# Patient Record
Sex: FEMALE | Race: WHITE | NOT HISPANIC OR LATINO | Employment: OTHER | URBAN - METROPOLITAN AREA
[De-identification: names, ages, dates, MRNs, and addresses within clinical notes are randomized per-mention and may not be internally consistent; named-entity substitution may affect disease eponyms.]

---

## 2018-05-19 ENCOUNTER — OFFICE VISIT (OUTPATIENT)
Dept: URGENT CARE | Facility: CLINIC | Age: 68
End: 2018-05-19
Payer: MEDICARE

## 2018-05-19 VITALS
HEART RATE: 95 BPM | RESPIRATION RATE: 18 BRPM | WEIGHT: 203 LBS | OXYGEN SATURATION: 99 % | TEMPERATURE: 99 F | HEIGHT: 68 IN | SYSTOLIC BLOOD PRESSURE: 195 MMHG | DIASTOLIC BLOOD PRESSURE: 95 MMHG | BODY MASS INDEX: 30.77 KG/M2

## 2018-05-19 DIAGNOSIS — H92.01 OTALGIA, RIGHT EAR: ICD-10-CM

## 2018-05-19 DIAGNOSIS — J32.9 RHINOSINUSITIS: ICD-10-CM

## 2018-05-19 DIAGNOSIS — R22.0 RIGHT FACIAL SWELLING: ICD-10-CM

## 2018-05-19 DIAGNOSIS — H65.93 OTITIS MEDIA WITH EFFUSION, BILATERAL: Primary | ICD-10-CM

## 2018-05-19 DIAGNOSIS — J30.2 SEASONAL ALLERGIC RHINITIS, UNSPECIFIED TRIGGER: ICD-10-CM

## 2018-05-19 PROCEDURE — 99203 OFFICE O/P NEW LOW 30 MIN: CPT | Mod: S$GLB,,, | Performed by: NURSE PRACTITIONER

## 2018-05-19 RX ORDER — AZELASTINE 1 MG/ML
1 SPRAY, METERED NASAL 2 TIMES DAILY
Qty: 30 ML | Refills: 0 | Status: SHIPPED | OUTPATIENT
Start: 2018-05-19 | End: 2023-02-24

## 2018-05-19 RX ORDER — AZITHROMYCIN 250 MG/1
TABLET, FILM COATED ORAL
Qty: 6 TABLET | Refills: 0 | Status: SHIPPED | OUTPATIENT
Start: 2018-05-19 | End: 2018-05-24

## 2018-05-19 RX ORDER — PREDNISONE 20 MG/1
40 TABLET ORAL DAILY
Qty: 10 TABLET | Refills: 0 | Status: SHIPPED | OUTPATIENT
Start: 2018-05-19 | End: 2018-05-24

## 2018-05-19 NOTE — PROGRESS NOTES
"Subjective:       Patient ID: Guillermina Martinez is a 67 y.o. female.    Vitals:    05/19/18 1424   BP: (!) 195/95   Pulse: 95   Resp: 18   Temp: 98.6 °F (37 °C)   SpO2: 99%   Weight: 92.1 kg (203 lb)   Height: 5' 7.5" (1.715 m)       Chief Complaint: Ear Fullness (BOTH PT SEEN ENT ON WEDNESDAY EAR FLUSHED OUT )    PATIENT STARTED WITH ALLERGIES AND CONGESTION A FEW WEEKS AGO. PATIENT WENT TO ENT Wednesday (4 DAYS AGO) TO HAVE HER EARS "FLUSHED". SHE ALSO HAD A STEROID SHOT . SHE NOTED IMPROVEMENT IN HER EAR PAIN, FULLNESS, AND MUFFLED SOUNDS. SHE THEN WENT TO THE DENTIST FOR A CLEANING AND FEELS OPENING HER MOUTH REAGGREGATED HER EAR AND SHE IS UNABLE TO POP IT, PAINFUL, TROUBLE HEARING, AND FEELS FACE SLIGHTLY TENDER ON RIGHT CHEEKBONE NEAR EAR. DENIES JAWPAIN, CHEEK PAIN, GUM OR TOOTH PAIN. DENTIST VISIT WAS JUST CLEANING AND NO HISTORY OF ABSCESS. DENIES FEVER. LEFT EAR WITHOUT COMPLAINTS. SHE REPORTS LONG HISTORY WITH ALLERGIES.    BP ELEVATED /95. PATIENT DOES NOT HAVE A PCP. SHE STATES SHE WAS BUSY CARING FOR HER ILL MOTHER, SHE NEGLECTED HERSELF. HER MOTHER PASSED OVER A YEAR AGO. ADVISED FOR PCP REFERRAL . SHE REFUSED STATING SHE WILL GET ONE WITH [facility] AND ASSURED ME SHE WOULD CALL NEXT WEEK. DISCUSSED IMPORTANCE OF BP CONTROL.        Sinus Problem   This is a new problem. The current episode started in the past 7 days. The problem is unchanged. There has been no fever. Her pain is at a severity of 0/10. Associated symptoms include ear pain (FULLNESS) and sinus pressure. Pertinent negatives include no chills, congestion, coughing, headaches, hoarse voice, shortness of breath or sore throat. Sneezing: FACE  Treatments tried: OTC ALLERGY MEDS. The treatment provided mild relief.     Review of Systems   Constitution: Positive for weakness (CANT SLEEP ). Negative for chills, fever and malaise/fatigue.   HENT: Positive for ear pain (FULLNESS) and sinus pressure. Negative for congestion, hoarse voice and sore " throat. Sneezing: FACE     Eyes: Negative for discharge and redness.   Cardiovascular: Negative for chest pain, dyspnea on exertion and leg swelling.   Respiratory: Negative for cough, shortness of breath, sputum production and wheezing.    Musculoskeletal: Negative for myalgias.   Gastrointestinal: Negative for abdominal pain and nausea.   Neurological: Negative for headaches.       Objective:      Physical Exam   Constitutional: She is oriented to person, place, and time. She appears well-developed and well-nourished. She is cooperative.  Non-toxic appearance. She does not appear ill. No distress.   HENT:   Head: Normocephalic and atraumatic.   Right Ear: Hearing, external ear and ear canal normal. Tympanic membrane is injected. A middle ear effusion is present.   Left Ear: Hearing, tympanic membrane, external ear and ear canal normal.   Nose: Mucosal edema and rhinorrhea present. No nasal deformity. No epistaxis. Right sinus exhibits no maxillary sinus tenderness and no frontal sinus tenderness. Left sinus exhibits no maxillary sinus tenderness and no frontal sinus tenderness.   Mouth/Throat: Uvula is midline and mucous membranes are normal. No trismus in the jaw. Normal dentition. No uvula swelling. Posterior oropharyngeal erythema present.   COBBLESTONING TO POSTERIOR OP.  MINIMAL SWELLING ALONG RIGHT JAWLINE, POSTERIOR TO LOBULE. NO TTP.  NASAL CONGESTION NOTED.     Eyes: Conjunctivae and lids are normal. No scleral icterus.   Sclera clear bilat   Neck: Trachea normal, full passive range of motion without pain and phonation normal. Neck supple.   Cardiovascular: Normal rate, regular rhythm, normal heart sounds, intact distal pulses and normal pulses.    Pulmonary/Chest: Effort normal and breath sounds normal. No respiratory distress.   Abdominal: Soft. Normal appearance and bowel sounds are normal. She exhibits no distension. There is no tenderness.   Musculoskeletal: Normal range of motion. She exhibits no  edema or deformity.   Lymphadenopathy:     She has cervical adenopathy.   Neurological: She is alert and oriented to person, place, and time. She exhibits normal muscle tone. Coordination normal.   Skin: Skin is warm, dry and intact. She is not diaphoretic. No pallor.   Psychiatric: She has a normal mood and affect. Her speech is normal and behavior is normal. Judgment and thought content normal. Cognition and memory are normal.   Nursing note and vitals reviewed.      Assessment:       1. Otitis media with effusion, bilateral    2. Otalgia, right ear    3. Seasonal allergic rhinitis, unspecified trigger    4. Right facial swelling    5. Rhinosinusitis        Plan:       Guillermina was seen today for ear fullness.    Diagnoses and all orders for this visit:    Otitis media with effusion, bilateral  -     predniSONE (DELTASONE) 20 MG tablet; Take 2 tablets (40 mg total) by mouth once daily.  -     azithromycin (Z-JACLYN) 250 MG tablet; Take 2 tablets by mouth on day 1; Take 1 tablet by mouth on days 2-5    Otalgia, right ear  -     predniSONE (DELTASONE) 20 MG tablet; Take 2 tablets (40 mg total) by mouth once daily.    Seasonal allergic rhinitis, unspecified trigger  -     azelastine (ASTELIN) 137 mcg (0.1 %) nasal spray; 1 spray (137 mcg total) by Nasal route 2 (two) times daily.    Right facial swelling    Rhinosinusitis  -     azithromycin (Z-JACLYN) 250 MG tablet; Take 2 tablets by mouth on day 1; Take 1 tablet by mouth on days 2-5      Patient Instructions   TAKE ALLEGRA AND FLONASE AS YOUR ENT DISCUSSED.  TAKE Coricidin® HBP FOR THE NEXT 7-14 DAYS FOR CONGESTION.  TAKE RX PREDNISONE FOR THE NEXT 5 DAYS.  TAKE ANTIBIOTIC AZITHROMYCIN FOR COMPLETION.  TAKE RX ASTELIN SPRAY-ONE SPRAY IN NARE IN MORNING, ONE IN AFTERNOON, AFTER FLONASE.    FOLLOW UP WITH PRIMARY CARE FOR MANAGEMENT OF YOUR BLOOD PRESSURE.    SEE HANDOUT ON EAR PAIN/INFECTION  FOLLOW UP WITH YOUR ENT IF SYMPTOMS DO NOT IMPROVE.      Nasal Allergies: Related  Problems  Allergies can cause nasal passages to swell. This narrows the air passages. Allergies also cause increased mucus production in the nose. These changes result in nasal allergy symptoms. Common symptoms include itching, sneezing, stuffy nose, and runny nose. Nasal allergies can also cause problems in other parts of the respiratory system. Some of the more common problems are discussed below. If you think you have any of these problems, talk to your healthcare provider about treatment choices.    Sinus infections  Fluid may be trapped in the sinuses. Bacteria may grow in trapped fluid. This causes sinus infection (sinusitis).  Conjunctivitis  Allergens irritate your eyes, including the lining of the conjunctiva. This causes eyes to become red, itchy, puffy, and watery.  Ear problems  The eustachian tube connects the middle ear to nasal passages.  Allergies can block this tube, and make the ears feel plugged. Fluid may also build up, leading to an ear infection (otitis media).  Nasal polyps  Allergies cause nasal passages to swell. Constant swelling can lead to formation of a sac called a polyp. Polyps can grow large enough to block nasal passages.  Asthma  Asthma is inflammation and swelling of the air passages in the lungs. The symptoms are wheezing, shortness of breath, coughing, and chest tightness. Allergies, including nasal allergies, are common in people with asthma.  Date Last Reviewed: 9/1/2016 © 2000-2017 [employer]. [address] [place] [address]. All rights reserved. This information is not intended as a substitute for professional medical care. Always follow your healthcare professional's instructions.        Common Middle Ear Problems    Your middle ear may have been injured or infected recently. Over time, certain growths or bone disease can also harm the middle ear. Left untreated, middle ear problems often lead to lifelong hearing loss. There are two types of hearing  loss: conductive and sensorineural. One or both kinds can occur. Injury, infection, certain growths, or bone disease can cause your symptoms. A ruptured eardrum or a long-lasting (chronic) ear infection may be painful and decrease hearing.  Symptoms  · Hearing loss in one or both ears  · Fluid, often smelly, draining from the ear  · Pain, pressure, or discomfort in the ear  · Ringing in the ear  Conductive and sensorineural hearing loss  Sound waves may be disrupted before they reach the inner ear. If this happens, conductive hearing loss may occur. The ear canal can be blocked by wax, infection, a tumor, or a foreign object. The eardrum can be injured or infected. Abnormal bone growth, infection, or tumors in the middle ear can block sound waves.  Sound waves may not be processed correctly in the inner ear. If this happens, sensorineural hearing loss may occur. Permanent hearing loss is most commonly associated with sensorineural problems.  The tests and evaluations used to diagnose what type of hearing problem you have will depend on your symptoms.   Date Last Reviewed: 10/1/2016  © 4353-2146 [employer]. [address] [place] [address]. All rights reserved. This information is not intended as a substitute for professional medical care. Always follow your healthcare professional's instructions.

## 2018-05-19 NOTE — PATIENT INSTRUCTIONS
TAKE ALLEGRA AND FLONASE AS YOUR ENT DISCUSSED.  TAKE Coricidin® HBP FOR THE NEXT 7-14 DAYS FOR CONGESTION.  TAKE RX PREDNISONE FOR THE NEXT 5 DAYS.  TAKE ANTIBIOTIC AZITHROMYCIN FOR COMPLETION.  TAKE RX ASTELIN SPRAY-ONE SPRAY IN NARE IN MORNING, ONE IN AFTERNOON, AFTER FLONASE.    FOLLOW UP WITH PRIMARY CARE FOR MANAGEMENT OF YOUR BLOOD PRESSURE.    SEE HANDOUT ON EAR PAIN/INFECTION  FOLLOW UP WITH YOUR ENT IF SYMPTOMS DO NOT IMPROVE.      Nasal Allergies: Related Problems  Allergies can cause nasal passages to swell. This narrows the air passages. Allergies also cause increased mucus production in the nose. These changes result in nasal allergy symptoms. Common symptoms include itching, sneezing, stuffy nose, and runny nose. Nasal allergies can also cause problems in other parts of the respiratory system. Some of the more common problems are discussed below. If you think you have any of these problems, talk to your healthcare provider about treatment choices.    Sinus infections  Fluid may be trapped in the sinuses. Bacteria may grow in trapped fluid. This causes sinus infection (sinusitis).  Conjunctivitis  Allergens irritate your eyes, including the lining of the conjunctiva. This causes eyes to become red, itchy, puffy, and watery.  Ear problems  The eustachian tube connects the middle ear to nasal passages.  Allergies can block this tube, and make the ears feel plugged. Fluid may also build up, leading to an ear infection (otitis media).  Nasal polyps  Allergies cause nasal passages to swell. Constant swelling can lead to formation of a sac called a polyp. Polyps can grow large enough to block nasal passages.  Asthma  Asthma is inflammation and swelling of the air passages in the lungs. The symptoms are wheezing, shortness of breath, coughing, and chest tightness. Allergies, including nasal allergies, are common in people with asthma.  Date Last Reviewed: 9/1/2016  © 4896-7859 [employer] [address]  [place] [address]. All rights reserved. This information is not intended as a substitute for professional medical care. Always follow your healthcare professional's instructions.        Common Middle Ear Problems    Your middle ear may have been injured or infected recently. Over time, certain growths or bone disease can also harm the middle ear. Left untreated, middle ear problems often lead to lifelong hearing loss. There are two types of hearing loss: conductive and sensorineural. One or both kinds can occur. Injury, infection, certain growths, or bone disease can cause your symptoms. A ruptured eardrum or a long-lasting (chronic) ear infection may be painful and decrease hearing.  Symptoms  · Hearing loss in one or both ears  · Fluid, often smelly, draining from the ear  · Pain, pressure, or discomfort in the ear  · Ringing in the ear  Conductive and sensorineural hearing loss  Sound waves may be disrupted before they reach the inner ear. If this happens, conductive hearing loss may occur. The ear canal can be blocked by wax, infection, a tumor, or a foreign object. The eardrum can be injured or infected. Abnormal bone growth, infection, or tumors in the middle ear can block sound waves.  Sound waves may not be processed correctly in the inner ear. If this happens, sensorineural hearing loss may occur. Permanent hearing loss is most commonly associated with sensorineural problems.  The tests and evaluations used to diagnose what type of hearing problem you have will depend on your symptoms.   Date Last Reviewed: 10/1/2016  © 5452-1307 [employer]. [address] [place] [address]. All rights reserved. This information is not intended as a substitute for professional medical care. Always follow your healthcare professional's instructions.

## 2019-12-31 ENCOUNTER — OFFICE VISIT (OUTPATIENT)
Dept: FAMILY MEDICINE | Facility: CLINIC | Age: 69
End: 2019-12-31
Payer: MEDICARE

## 2019-12-31 VITALS
TEMPERATURE: 98 F | HEART RATE: 72 BPM | OXYGEN SATURATION: 98 % | HEIGHT: 67 IN | RESPIRATION RATE: 17 BRPM | BODY MASS INDEX: 32.53 KG/M2 | SYSTOLIC BLOOD PRESSURE: 168 MMHG | WEIGHT: 207.25 LBS | DIASTOLIC BLOOD PRESSURE: 92 MMHG

## 2019-12-31 DIAGNOSIS — E66.8 OTHER OBESITY: ICD-10-CM

## 2019-12-31 DIAGNOSIS — Z11.59 NEED FOR HEPATITIS C SCREENING TEST: ICD-10-CM

## 2019-12-31 DIAGNOSIS — Z13.220 SCREENING FOR HYPERLIPIDEMIA: ICD-10-CM

## 2019-12-31 DIAGNOSIS — I10 HYPERTENSION, ESSENTIAL: Primary | ICD-10-CM

## 2019-12-31 PROCEDURE — 1126F AMNT PAIN NOTED NONE PRSNT: CPT | Mod: ,,, | Performed by: INTERNAL MEDICINE

## 2019-12-31 PROCEDURE — 1159F PR MEDICATION LIST DOCUMENTED IN MEDICAL RECORD: ICD-10-PCS | Mod: ,,, | Performed by: INTERNAL MEDICINE

## 2019-12-31 PROCEDURE — 1159F MED LIST DOCD IN RCRD: CPT | Mod: ,,, | Performed by: INTERNAL MEDICINE

## 2019-12-31 PROCEDURE — 99999 PR PBB SHADOW E&M-EST. PATIENT-LVL III: ICD-10-PCS | Mod: PBBFAC,,, | Performed by: INTERNAL MEDICINE

## 2019-12-31 PROCEDURE — 1126F PR PAIN SEVERITY QUANTIFIED, NO PAIN PRESENT: ICD-10-PCS | Mod: ,,, | Performed by: INTERNAL MEDICINE

## 2019-12-31 PROCEDURE — 93010 EKG 12-LEAD: ICD-10-PCS | Mod: S$PBB,,, | Performed by: INTERNAL MEDICINE

## 2019-12-31 PROCEDURE — 93005 ELECTROCARDIOGRAM TRACING: CPT | Mod: PBBFAC,PN | Performed by: INTERNAL MEDICINE

## 2019-12-31 PROCEDURE — 99203 OFFICE O/P NEW LOW 30 MIN: CPT | Mod: S$PBB,,, | Performed by: INTERNAL MEDICINE

## 2019-12-31 PROCEDURE — 99999 PR PBB SHADOW E&M-EST. PATIENT-LVL III: CPT | Mod: PBBFAC,,, | Performed by: INTERNAL MEDICINE

## 2019-12-31 PROCEDURE — 81003 URINALYSIS AUTO W/O SCOPE: CPT

## 2019-12-31 PROCEDURE — 93010 ELECTROCARDIOGRAM REPORT: CPT | Mod: S$PBB,,, | Performed by: INTERNAL MEDICINE

## 2019-12-31 PROCEDURE — 99203 PR OFFICE/OUTPT VISIT, NEW, LEVL III, 30-44 MIN: ICD-10-PCS | Mod: S$PBB,,, | Performed by: INTERNAL MEDICINE

## 2019-12-31 PROCEDURE — 99213 OFFICE O/P EST LOW 20 MIN: CPT | Mod: PBBFAC,PN | Performed by: INTERNAL MEDICINE

## 2019-12-31 RX ORDER — CHLORTHALIDONE 25 MG/1
25 TABLET ORAL DAILY
Qty: 30 TABLET | Refills: 2 | Status: SHIPPED | OUTPATIENT
Start: 2019-12-31 | End: 2020-01-22 | Stop reason: SDUPTHER

## 2019-12-31 NOTE — PROGRESS NOTES
Subjective:       Patient ID: Guillermina Martinez is a 69 y.o. female.    Chief Complaint: Establish Care; Follow-up; and Hypertension (/97 No meds taken )    Hypertension   This is a new problem. The problem is uncontrolled. Pertinent negatives include no anxiety, blurred vision, chest pain, headaches, palpitations, peripheral edema or shortness of breath. There are no associated agents to hypertension. Risk factors for coronary artery disease include post-menopausal state and obesity. Past treatments include nothing.   has been told she has elevated blood pressure by dentist for at least last year never taken any medicaition doing silver sneaker exercise classes two to three times per week w/o exertional symptoms never used tobacco no alcohol. Lives alone retired .   Father with HTN no family history of early CAD   PSurgHx: right arm fracture with hardware  Review of Systems   Constitutional: Negative for activity change, appetite change, fatigue, fever and unexpected weight change.   HENT: Negative for ear pain, rhinorrhea and sore throat.    Eyes: Negative for blurred vision, discharge and visual disturbance.   Respiratory: Negative for chest tightness, shortness of breath and wheezing.    Cardiovascular: Negative for chest pain, palpitations and leg swelling.   Gastrointestinal: Negative for abdominal pain, constipation and diarrhea.   Endocrine: Negative for cold intolerance and heat intolerance.   Genitourinary: Negative for dysuria and hematuria.   Musculoskeletal: Negative for joint swelling and neck stiffness.   Skin: Negative for rash.   Neurological: Negative for dizziness, syncope, weakness and headaches.   Psychiatric/Behavioral: Negative for suicidal ideas.       Objective:     Vitals:    12/31/19 0953 12/31/19 1015   BP: (!) 179/97 (!) 168/92   BP Location: Right arm    Patient Position: Sitting    Pulse: 97 72   Resp: 17    Temp: 98.1 °F (36.7 °C)    TempSrc: Oral    SpO2: 98%    Weight:  "94 kg (207 lb 3.7 oz)    Height: 5' 7" (1.702 m)           Physical Exam   Constitutional: She is oriented to person, place, and time. She appears well-developed and well-nourished.   HENT:   Head: Normocephalic and atraumatic.   Eyes: Conjunctivae are normal. No scleral icterus.   Neck: Normal range of motion.   Cardiovascular: Normal rate and regular rhythm. Exam reveals no gallop and no friction rub.   No murmur heard.  No LE edema   Pulmonary/Chest: Effort normal and breath sounds normal. She has no wheezes. She has no rales.   Abdominal: Soft. Bowel sounds are normal. There is no tenderness. There is no rebound and no guarding.   Musculoskeletal: Normal range of motion. She exhibits no edema or tenderness.   Neurological: She is alert and oriented to person, place, and time. No cranial nerve deficit.   Skin: Skin is warm and dry.   Psychiatric: She has a normal mood and affect.     EKG sinus rate 80 no LVH or acute ischemic changes  Assessment and Plan   1. Hypertension, essential  Screen for secondary cause. Will likely need two agents given level of elevation start with thiazide BP check in one week see me in two weeks for further adjustment  - CBC auto differential; Future  - Comprehensive metabolic panel; Future  - Urinalysis  - EKG 12-lead  - TSH; Future  - chlorthalidone (HYGROTEN) 25 MG Tab; Take 1 tablet (25 mg total) by mouth once daily.  Dispense: 30 tablet; Refill: 2    2. Screening for hyperlipidemia  Fasting lipid for further risk stratification  - Comprehensive metabolic panel; Future  - Lipid panel; Future    3. Need for hepatitis C screening test  Hep c ab  - Hepatitis C antibody; Future    4. Other obesity   Screen for endocrine causes  - Lipid panel; Future  - TSH; Future    Will discuss preventative screening tests at follow up   "

## 2020-01-02 LAB
BILIRUB UR QL STRIP: NEGATIVE
CLARITY UR: CLEAR
COLOR UR: YELLOW
GLUCOSE UR QL STRIP: NEGATIVE
HGB UR QL STRIP: NEGATIVE
KETONES UR QL STRIP: ABNORMAL
LEUKOCYTE ESTERASE UR QL STRIP: NEGATIVE
NITRITE UR QL STRIP: NEGATIVE
PH UR STRIP: 7 [PH] (ref 5–8)
PROT UR QL STRIP: NEGATIVE
SP GR UR STRIP: 1.02 (ref 1–1.03)
URN SPEC COLLECT METH UR: ABNORMAL
UROBILINOGEN UR STRIP-ACNC: NEGATIVE EU/DL

## 2020-01-08 ENCOUNTER — PATIENT OUTREACH (OUTPATIENT)
Dept: ADMINISTRATIVE | Facility: HOSPITAL | Age: 70
End: 2020-01-08

## 2020-01-08 DIAGNOSIS — Z12.31 VISIT FOR SCREENING MAMMOGRAM: Primary | ICD-10-CM

## 2020-01-10 ENCOUNTER — CLINICAL SUPPORT (OUTPATIENT)
Dept: FAMILY MEDICINE | Facility: CLINIC | Age: 70
End: 2020-01-10
Payer: MEDICARE

## 2020-01-10 VITALS — HEART RATE: 87 BPM | DIASTOLIC BLOOD PRESSURE: 80 MMHG | OXYGEN SATURATION: 97 % | SYSTOLIC BLOOD PRESSURE: 136 MMHG

## 2020-01-10 DIAGNOSIS — I10 HYPERTENSION, ESSENTIAL: Primary | ICD-10-CM

## 2020-01-10 PROCEDURE — 99999 PR PBB SHADOW E&M-EST. PATIENT-LVL II: ICD-10-PCS | Mod: PBBFAC,,,

## 2020-01-10 PROCEDURE — 99212 OFFICE O/P EST SF 10 MIN: CPT | Mod: PBBFAC,PN

## 2020-01-10 PROCEDURE — 99499 NO LOS: ICD-10-PCS | Mod: S$PBB,,, | Performed by: INTERNAL MEDICINE

## 2020-01-10 PROCEDURE — 99999 PR PBB SHADOW E&M-EST. PATIENT-LVL II: CPT | Mod: PBBFAC,,,

## 2020-01-10 PROCEDURE — 99499 UNLISTED E&M SERVICE: CPT | Mod: S$PBB,,, | Performed by: INTERNAL MEDICINE

## 2020-01-10 NOTE — PROGRESS NOTES
Apollo CHUNG May 69 y.o. female is here today for Blood Pressure check.   History of HTN yes.     Review of patient's allergies indicates:   Allergen Reactions    Penicillins      Creatinine   Date Value Ref Range Status   12/31/2019 0.7 0.5 - 1.4 mg/dL Final     Sodium   Date Value Ref Range Status   12/31/2019 135 (L) 136 - 145 mmol/L Final     Potassium   Date Value Ref Range Status   12/31/2019 3.9 3.5 - 5.1 mmol/L Final   ]  Patient verifies taking blood pressure medications on a regular basis at the same time of the day.     Current Outpatient Medications:     azelastine (ASTELIN) 137 mcg (0.1 %) nasal spray, 1 spray (137 mcg total) by Nasal route 2 (two) times daily., Disp: 30 mL, Rfl: 0    chlorthalidone (HYGROTEN) 25 MG Tab, Take 1 tablet (25 mg total) by mouth once daily., Disp: 30 tablet, Rfl: 2    loratadine (CLARITIN) 5 mg chewable tablet, Take 5 mg by mouth once daily., Disp: , Rfl:   Does patient have record of home blood pressure readings no. Readings have been averaging .   Last dose of blood pressure medication was taken at 6:30AM.  Patient is asymptomatic.   Complains of no complaints.    BP: 136/80 , Pulse: 87 .    Blood pressure reading after 15 minutes was not repeated.  Dr. Hinojosa notified.

## 2020-01-17 DIAGNOSIS — Z12.11 COLON CANCER SCREENING: ICD-10-CM

## 2020-01-22 ENCOUNTER — OFFICE VISIT (OUTPATIENT)
Dept: FAMILY MEDICINE | Facility: CLINIC | Age: 70
End: 2020-01-22
Payer: MEDICARE

## 2020-01-22 VITALS
TEMPERATURE: 97 F | OXYGEN SATURATION: 100 % | HEIGHT: 67 IN | SYSTOLIC BLOOD PRESSURE: 132 MMHG | BODY MASS INDEX: 31.18 KG/M2 | HEART RATE: 80 BPM | WEIGHT: 198.63 LBS | RESPIRATION RATE: 17 BRPM | DIASTOLIC BLOOD PRESSURE: 86 MMHG

## 2020-01-22 DIAGNOSIS — E78.5 HYPERLIPIDEMIA, UNSPECIFIED HYPERLIPIDEMIA TYPE: ICD-10-CM

## 2020-01-22 DIAGNOSIS — K21.9 GASTROESOPHAGEAL REFLUX DISEASE, ESOPHAGITIS PRESENCE NOT SPECIFIED: Primary | ICD-10-CM

## 2020-01-22 DIAGNOSIS — I10 HYPERTENSION, ESSENTIAL: ICD-10-CM

## 2020-01-22 PROCEDURE — 1159F MED LIST DOCD IN RCRD: CPT | Mod: ,,, | Performed by: INTERNAL MEDICINE

## 2020-01-22 PROCEDURE — 99214 OFFICE O/P EST MOD 30 MIN: CPT | Mod: S$PBB,,, | Performed by: INTERNAL MEDICINE

## 2020-01-22 PROCEDURE — 1126F PR PAIN SEVERITY QUANTIFIED, NO PAIN PRESENT: ICD-10-PCS | Mod: ,,, | Performed by: INTERNAL MEDICINE

## 2020-01-22 PROCEDURE — 1126F AMNT PAIN NOTED NONE PRSNT: CPT | Mod: ,,, | Performed by: INTERNAL MEDICINE

## 2020-01-22 PROCEDURE — 99214 PR OFFICE/OUTPT VISIT, EST, LEVL IV, 30-39 MIN: ICD-10-PCS | Mod: S$PBB,,, | Performed by: INTERNAL MEDICINE

## 2020-01-22 PROCEDURE — 99999 PR PBB SHADOW E&M-EST. PATIENT-LVL IV: CPT | Mod: PBBFAC,,, | Performed by: INTERNAL MEDICINE

## 2020-01-22 PROCEDURE — 99999 PR PBB SHADOW E&M-EST. PATIENT-LVL IV: ICD-10-PCS | Mod: PBBFAC,,, | Performed by: INTERNAL MEDICINE

## 2020-01-22 PROCEDURE — 99214 OFFICE O/P EST MOD 30 MIN: CPT | Mod: PBBFAC,PN | Performed by: INTERNAL MEDICINE

## 2020-01-22 PROCEDURE — 1159F PR MEDICATION LIST DOCUMENTED IN MEDICAL RECORD: ICD-10-PCS | Mod: ,,, | Performed by: INTERNAL MEDICINE

## 2020-01-22 RX ORDER — FAMOTIDINE 40 MG/1
40 TABLET, FILM COATED ORAL NIGHTLY PRN
Qty: 30 TABLET | Refills: 5 | Status: SHIPPED | OUTPATIENT
Start: 2020-01-22 | End: 2021-09-14

## 2020-01-22 RX ORDER — CHLORTHALIDONE 25 MG/1
25 TABLET ORAL DAILY
Qty: 90 TABLET | Refills: 0 | Status: SHIPPED | OUTPATIENT
Start: 2020-01-22 | End: 2021-09-14

## 2020-01-22 RX ORDER — FAMOTIDINE 40 MG/1
40 TABLET, FILM COATED ORAL NIGHTLY PRN
Qty: 30 TABLET | Refills: 5 | Status: SHIPPED | OUTPATIENT
Start: 2020-01-22 | End: 2020-01-22

## 2020-01-22 NOTE — PROGRESS NOTES
Subjective:       Patient ID: Guillermina Martinez is a 69 y.o. female.    Chief Complaint: Establish Care and Follow-up (BP)    Hypertension   This is a chronic problem. The current episode started more than 1 year ago. The problem has been gradually improving since onset. The problem is controlled. Pertinent negatives include no chest pain, headaches, palpitations, peripheral edema or shortness of breath. Risk factors for coronary artery disease include obesity, post-menopausal state and dyslipidemia. Past treatments include diuretics. The current treatment provides moderate improvement. There is no history of kidney disease, CAD/MI, CVA or left ventricular hypertrophy. There is no history of chronic renal disease.   she has been taking thiazide. Has had multiple dental procedures since last visit this has caused some discomfort. No chest pain. She is starting to increase her physical activity and has cut down on sugary drinks. Very resistant to further medications or invasive testing  Review of Systems   Constitutional: Negative for activity change, appetite change, fatigue, fever and unexpected weight change.   HENT: Negative for ear pain, rhinorrhea and sore throat.    Eyes: Negative for discharge and visual disturbance.   Respiratory: Negative for chest tightness, shortness of breath and wheezing.    Cardiovascular: Negative for chest pain, palpitations and leg swelling.   Gastrointestinal: Negative for abdominal pain, constipation and diarrhea.   Endocrine: Negative for cold intolerance and heat intolerance.   Genitourinary: Negative for dysuria and hematuria.   Musculoskeletal: Negative for joint swelling and neck stiffness.   Skin: Negative for rash.   Neurological: Negative for dizziness, syncope, weakness and headaches.   Psychiatric/Behavioral: Negative for suicidal ideas.       Objective:     Vitals:    01/22/20 0858 01/22/20 0920   BP: (!) 147/87 132/86   BP Location: Left arm    Patient Position: Sitting    BP  "Method: Medium (Automatic)    Pulse: 80 80   Resp: 17    Temp: 97 °F (36.1 °C)    TempSrc: Oral    SpO2: 100%    Weight: 90.1 kg (198 lb 10.2 oz)    Height: 5' 7" (1.702 m)           Physical Exam   Constitutional: She is oriented to person, place, and time. She appears well-developed and well-nourished.   HENT:   Head: Normocephalic and atraumatic.   Eyes: Pupils are equal, round, and reactive to light. Conjunctivae are normal. No scleral icterus.   Neck: Normal range of motion.   Cardiovascular: Normal rate and regular rhythm. Exam reveals no gallop and no friction rub.   No murmur heard.  No LE edema   Pulmonary/Chest: Effort normal and breath sounds normal. She has no wheezes. She has no rales.   Abdominal: Soft. Bowel sounds are normal. There is no tenderness. There is no rebound and no guarding.   Musculoskeletal: Normal range of motion. She exhibits no edema or tenderness.   Neurological: She is alert and oriented to person, place, and time. No cranial nerve deficit.   Skin: Skin is warm and dry.   Psychiatric: She has a normal mood and affect.       Assessment and Plan   1. Hypertension, essential  Just at goal continue current medication repeat electrolytes in three more months  - chlorthalidone (HYGROTEN) 25 MG Tab; Take 1 tablet (25 mg total) by mouth once daily.  Dispense: 90 tablet; Refill: 0  - Comprehensive metabolic panel; Future    2. Gastroesophageal reflux disease, esophagitis presence not specified  Trial h2 blocker (previously on recalled rantinidine)  - famotidine (PEPCID) 40 MG tablet; Take 1 tablet (40 mg total) by mouth nightly as needed for Heartburn.  Dispense: 30 tablet; Refill: 5    3. Hyperlipidemia, unspecified hyperlipidemia type  Would like to trial life style modifications prior to further medicaitons reasonable. Repeat lipids prior to follow up in three months  - Comprehensive metabolic panel; Future  - Lipid panel; Future    Fit Kit dispensed today for CRC screening. Declined " mammogram at this time

## 2020-10-13 ENCOUNTER — OFFICE VISIT (OUTPATIENT)
Dept: FAMILY MEDICINE | Facility: CLINIC | Age: 70
End: 2020-10-13
Payer: MEDICARE

## 2020-10-13 VITALS
OXYGEN SATURATION: 96 % | BODY MASS INDEX: 32.21 KG/M2 | HEIGHT: 67 IN | RESPIRATION RATE: 16 BRPM | WEIGHT: 205.25 LBS | SYSTOLIC BLOOD PRESSURE: 158 MMHG | TEMPERATURE: 98 F | DIASTOLIC BLOOD PRESSURE: 94 MMHG | HEART RATE: 82 BPM

## 2020-10-13 DIAGNOSIS — N30.01 ACUTE CYSTITIS WITH HEMATURIA: Primary | ICD-10-CM

## 2020-10-13 LAB
BILIRUB SERPL-MCNC: ABNORMAL MG/DL
BLOOD URINE, POC: ABNORMAL
COLOR, POC UA: YELLOW
GLUCOSE UR QL STRIP: NORMAL
KETONES UR QL STRIP: ABNORMAL
LEUKOCYTE ESTERASE URINE, POC: ABNORMAL
NITRITE, POC UA: ABNORMAL
PH, POC UA: 6
PROTEIN, POC: ABNORMAL
SPECIFIC GRAVITY, POC UA: 1.01
UROBILINOGEN, POC UA: NORMAL

## 2020-10-13 PROCEDURE — 81001 URINALYSIS AUTO W/SCOPE: CPT | Mod: PBBFAC,PO | Performed by: FAMILY MEDICINE

## 2020-10-13 PROCEDURE — 99213 OFFICE O/P EST LOW 20 MIN: CPT | Mod: PBBFAC,PO | Performed by: FAMILY MEDICINE

## 2020-10-13 PROCEDURE — 99999 PR PBB SHADOW E&M-EST. PATIENT-LVL III: CPT | Mod: PBBFAC,,, | Performed by: FAMILY MEDICINE

## 2020-10-13 PROCEDURE — 87086 URINE CULTURE/COLONY COUNT: CPT

## 2020-10-13 PROCEDURE — 87186 SC STD MICRODIL/AGAR DIL: CPT

## 2020-10-13 PROCEDURE — 99213 PR OFFICE/OUTPT VISIT, EST, LEVL III, 20-29 MIN: ICD-10-PCS | Mod: S$PBB,,, | Performed by: FAMILY MEDICINE

## 2020-10-13 PROCEDURE — 99213 OFFICE O/P EST LOW 20 MIN: CPT | Mod: S$PBB,,, | Performed by: FAMILY MEDICINE

## 2020-10-13 PROCEDURE — 99999 PR PBB SHADOW E&M-EST. PATIENT-LVL III: ICD-10-PCS | Mod: PBBFAC,,, | Performed by: FAMILY MEDICINE

## 2020-10-13 PROCEDURE — 87088 URINE BACTERIA CULTURE: CPT

## 2020-10-13 PROCEDURE — 87077 CULTURE AEROBIC IDENTIFY: CPT

## 2020-10-13 RX ORDER — CIPROFLOXACIN 500 MG/1
500 TABLET ORAL 2 TIMES DAILY
Qty: 14 TABLET | Refills: 0 | Status: SHIPPED | OUTPATIENT
Start: 2020-10-13 | End: 2020-10-20

## 2020-10-13 NOTE — PROGRESS NOTES
Chief Complaint   Patient presents with    Urinary Tract Infection       Guillermina Martinez is a 70 y.o. female who presents per the Chief Complaint.  Pt is known to this practice but has not been seen by me previously.  All known chronic medical issues have been documented.        Urinary Tract Infection   This is a new problem. The current episode started in the past 7 days. The problem occurs every urination. The problem has been unchanged. The quality of the pain is described as aching. The pain is at a severity of 2/10. The pain is mild. There has been no fever. There is no history of pyelonephritis. Associated symptoms include frequency and urgency. Pertinent negatives include no behavior changes, chills, discharge, flank pain, hematuria, hesitancy, nausea, possible pregnancy, sweats, vomiting, weight loss, bubble bath use, constipation, rash or withholding. She has tried increased fluids for the symptoms. The treatment provided mild relief. There is no history of catheterization, diabetes insipidus, diabetes mellitus, genitourinary reflux, hypertension, kidney stones, recurrent UTIs, a single kidney, STD, urinary stasis or a urological procedure.       ROS  Review of Systems   Constitutional: Negative.  Negative for activity change, appetite change, chills, diaphoresis, fatigue, fever, unexpected weight change and weight loss.   HENT: Negative.  Negative for congestion, ear pain, hearing loss, nosebleeds, postnasal drip, rhinorrhea, sinus pressure, sneezing, sore throat and trouble swallowing.    Eyes: Negative for pain and visual disturbance.   Respiratory: Negative for cough, choking and shortness of breath.    Cardiovascular: Negative for chest pain and leg swelling.   Gastrointestinal: Negative for abdominal pain, constipation, diarrhea, nausea and vomiting.   Genitourinary: Positive for frequency and urgency. Negative for difficulty urinating, dysuria, flank pain, hematuria and hesitancy.   Musculoskeletal:  "Negative.  Negative for arthralgias, back pain, gait problem, joint swelling and myalgias.   Skin: Negative.  Negative for rash.   Allergic/Immunologic: Negative for environmental allergies and food allergies.   Neurological: Negative.  Negative for dizziness, seizures, syncope, weakness, light-headedness and headaches.   Psychiatric/Behavioral: Negative.  Negative for confusion, decreased concentration, dysphoric mood and sleep disturbance. The patient is not nervous/anxious.        Physical Exam  Vitals:    10/13/20 0943   BP: (!) 158/94   Pulse: 82   Resp: 16   Temp: 98.3 °F (36.8 °C)    Body mass index is 32.15 kg/m².  Weight: 93.1 kg (205 lb 4 oz)   Height: 5' 7" (170.2 cm)     Physical Exam  Constitutional:       General: She is not in acute distress.     Appearance: Normal appearance. She is well-developed. She is not ill-appearing, toxic-appearing or diaphoretic.   HENT:      Head: Normocephalic and atraumatic.      Right Ear: Hearing and external ear normal. No decreased hearing noted.      Left Ear: Hearing and external ear normal. No decreased hearing noted.      Nose: Nose normal. No nasal deformity or rhinorrhea.      Mouth/Throat:      Dentition: Normal dentition. Does not have dentures.      Pharynx: Uvula midline.   Eyes:      General: Lids are normal. No scleral icterus.        Right eye: No foreign body or discharge.         Left eye: No foreign body or discharge.      Conjunctiva/sclera: Conjunctivae normal.      Right eye: No chemosis or exudate.     Left eye: No chemosis or exudate.     Pupils: Pupils are equal, round, and reactive to light.   Neck:      Musculoskeletal: Full passive range of motion without pain, normal range of motion and neck supple.   Cardiovascular:      Rate and Rhythm: Normal rate and regular rhythm.      Heart sounds: Normal heart sounds, S1 normal and S2 normal. No murmur. No friction rub. No gallop.    Pulmonary:      Effort: Pulmonary effort is normal. No accessory " muscle usage or respiratory distress.      Breath sounds: Normal breath sounds. No decreased breath sounds, wheezing, rhonchi or rales.   Abdominal:      General: There is no distension.      Palpations: Abdomen is soft. Abdomen is not rigid.      Tenderness: There is no abdominal tenderness. There is no guarding or rebound.   Musculoskeletal: Normal range of motion.   Skin:     General: Skin is warm and dry.      Findings: No rash.   Neurological:      Mental Status: She is alert and oriented to person, place, and time.      Cranial Nerves: No cranial nerve deficit.      Sensory: No sensory deficit.      Motor: No abnormal muscle tone or seizure activity.      Coordination: Coordination normal.      Gait: Gait normal.   Psychiatric:         Attention and Perception: She is attentive.         Speech: Speech normal.         Behavior: Behavior normal. Behavior is cooperative.         Thought Content: Thought content normal.         Judgment: Judgment normal.         Assessment & Plan    Discussion of plan of care including treatment options regarding health and wellness were reviewed and discussed with patient.  Any changes to medication or treatment plan, as well as any screening blood test, imaging, or referrals to specialist, are documented.  Follow up as indicated.     1. Acute cystitis with hematuria  POCT UA with current symptoms support diagnosis of UTI; start antibiotic therapy and follow up as needed   - ciprofloxacin HCl (CIPRO) 500 MG tablet; Take 1 tablet (500 mg total) by mouth 2 (two) times daily. for 7 days  Dispense: 14 tablet; Refill: 0  - POCT urinalysis, dipstick or tablet reag  - Urine culture        Follow up if symptoms worsen or fail to improve.      ACTIVE MEDICAL ISSUES:  Documented in Problem List    PAST MEDICAL HISTORY  Documented  .  PAST SURGICAL HISTORY:  Documented    SOCIAL HISTORY:  Documented    FAMILY HISTORY:  Documented    ALLERGIES AND MEDICATIONS: updated and  reviewed.  Documented    Health Maintenance       Date Due Completion Date    TETANUS VACCINE 06/01/1969 ---    Mammogram 06/01/1991 ---    DEXA SCAN 06/01/1991 ---    Shingles Vaccine (1 of 2) 06/01/2001 ---    Colorectal Cancer Screening 06/01/2001 ---    Pneumococcal Vaccine (65+ Low/Medium Risk) (1 of 2 - PCV13) 06/01/2016 ---    Influenza Vaccine (1) 08/01/2020 ---    Lipid Panel 12/31/2024 12/31/2019

## 2020-10-15 LAB — BACTERIA UR CULT: ABNORMAL

## 2020-10-28 ENCOUNTER — OFFICE VISIT (OUTPATIENT)
Dept: FAMILY MEDICINE | Facility: CLINIC | Age: 70
End: 2020-10-28
Payer: MEDICARE

## 2020-10-28 VITALS
DIASTOLIC BLOOD PRESSURE: 80 MMHG | BODY MASS INDEX: 31.83 KG/M2 | HEART RATE: 78 BPM | OXYGEN SATURATION: 97 % | TEMPERATURE: 99 F | HEIGHT: 67 IN | WEIGHT: 202.81 LBS | SYSTOLIC BLOOD PRESSURE: 136 MMHG | RESPIRATION RATE: 18 BRPM

## 2020-10-28 DIAGNOSIS — N30.01 ACUTE CYSTITIS WITH HEMATURIA: Primary | ICD-10-CM

## 2020-10-28 LAB
BACTERIA #/AREA URNS HPF: ABNORMAL /HPF
BILIRUB SERPL-MCNC: NEGATIVE MG/DL
BILIRUB UR QL STRIP: NEGATIVE
BLOOD URINE, POC: NORMAL
CLARITY UR: CLEAR
CLARITY, POC UA: NORMAL
COLOR UR: YELLOW
COLOR, POC UA: YELLOW
GLUCOSE UR QL STRIP: NEGATIVE
GLUCOSE UR QL STRIP: NORMAL
HGB UR QL STRIP: NEGATIVE
KETONES UR QL STRIP: NEGATIVE
KETONES UR QL STRIP: NEGATIVE
LEUKOCYTE ESTERASE UR QL STRIP: ABNORMAL
LEUKOCYTE ESTERASE URINE, POC: NORMAL
MICROSCOPIC COMMENT: ABNORMAL
NITRITE UR QL STRIP: NEGATIVE
NITRITE, POC UA: NEGATIVE
PH UR STRIP: 7 [PH] (ref 5–8)
PH, POC UA: 7
PROT UR QL STRIP: NEGATIVE
PROTEIN, POC: POSITIVE
RBC #/AREA URNS HPF: 0 /HPF (ref 0–4)
SP GR UR STRIP: 1.01 (ref 1–1.03)
SPECIFIC GRAVITY, POC UA: 1.01
SQUAMOUS #/AREA URNS HPF: 3 /HPF
URN SPEC COLLECT METH UR: ABNORMAL
UROBILINOGEN UR STRIP-ACNC: NEGATIVE EU/DL
UROBILINOGEN, POC UA: NORMAL
WBC #/AREA URNS HPF: 35 /HPF (ref 0–5)

## 2020-10-28 PROCEDURE — 87186 SC STD MICRODIL/AGAR DIL: CPT

## 2020-10-28 PROCEDURE — 99214 PR OFFICE/OUTPT VISIT, EST, LEVL IV, 30-39 MIN: ICD-10-PCS | Mod: S$PBB,,, | Performed by: NURSE PRACTITIONER

## 2020-10-28 PROCEDURE — 99999 PR PBB SHADOW E&M-EST. PATIENT-LVL IV: CPT | Mod: PBBFAC,,, | Performed by: NURSE PRACTITIONER

## 2020-10-28 PROCEDURE — 87088 URINE BACTERIA CULTURE: CPT

## 2020-10-28 PROCEDURE — 99214 OFFICE O/P EST MOD 30 MIN: CPT | Mod: S$PBB,,, | Performed by: NURSE PRACTITIONER

## 2020-10-28 PROCEDURE — 81002 URINALYSIS NONAUTO W/O SCOPE: CPT | Mod: PBBFAC,PN | Performed by: NURSE PRACTITIONER

## 2020-10-28 PROCEDURE — 99999 PR PBB SHADOW E&M-EST. PATIENT-LVL IV: ICD-10-PCS | Mod: PBBFAC,,, | Performed by: NURSE PRACTITIONER

## 2020-10-28 PROCEDURE — 99214 OFFICE O/P EST MOD 30 MIN: CPT | Mod: PBBFAC,PN | Performed by: NURSE PRACTITIONER

## 2020-10-28 PROCEDURE — 87086 URINE CULTURE/COLONY COUNT: CPT

## 2020-10-28 PROCEDURE — 87077 CULTURE AEROBIC IDENTIFY: CPT

## 2020-10-28 PROCEDURE — 81000 URINALYSIS NONAUTO W/SCOPE: CPT

## 2020-10-28 RX ORDER — SULFAMETHOXAZOLE AND TRIMETHOPRIM 800; 160 MG/1; MG/1
1 TABLET ORAL 2 TIMES DAILY
Qty: 6 TABLET | Refills: 0 | Status: SHIPPED | OUTPATIENT
Start: 2020-10-28 | End: 2020-10-31

## 2020-10-28 NOTE — PROGRESS NOTES
Routine Office Visit    Patient Name: Guillermina Martinez    : 1950  MRN: [id number]    Chief Complaint:  UTI    Subjective:  Guillermina is a 70 y.o. female who presents today for:    1.  UTI - patient reports today for symptoms of a potential UTI.  She was diagnosed with a UTI due to E coli 2 weeks ago and was treated with ciprofloxacin.  She states that the symptoms improved after the cirpo regimen, however 2-3 days ago she started to develop urinary symptoms again of frequency and dysuria.  She states that she believes eating artichokes contributed to her symptoms.  She denies any hematuria, fevers, chills, nausea, vomiting, diarrhea, or back pain.  She has been using azo, cranberry juice, and alkaline water for the symptoms without significant benefit.  No other acute complaints.    Past Medical History  History reviewed. No pertinent past medical history.    Past Surgical History  History reviewed. No pertinent surgical history.    Family History  History reviewed. No pertinent family history.    Social History  Social History     Socioeconomic History    Marital status: Single     Spouse name: Not on file    Number of children: Not on file    Years of education: Not on file    Highest education level: Not on file   Occupational History    Not on file   Social Needs    Financial resource strain: Not on file    Food insecurity     Worry: Not on file     Inability: Not on file    Transportation needs     Medical: Not on file     Non-medical: Not on file   Tobacco Use    Smoking status: Never Smoker   Substance and Sexual Activity    Alcohol use: Not on file    Drug use: Not on file    Sexual activity: Not on file   Lifestyle    Physical activity     Days per week: Not on file     Minutes per session: Not on file    Stress: Not on file   Relationships    Social connections     Talks on phone: Not on file     Gets together: Not on file     Attends Church service: Not on file     Active member of club or  "organization: Not on file     Attends meetings of clubs or organizations: Not on file     Relationship status: Not on file   Other Topics Concern    Not on file   Social History Narrative    Not on file       Current Medications  Current Outpatient Medications on File Prior to Visit   Medication Sig Dispense Refill    azelastine (ASTELIN) 137 mcg (0.1 %) nasal spray 1 spray (137 mcg total) by Nasal route 2 (two) times daily. 30 mL 0    loratadine (CLARITIN) 5 mg chewable tablet Take 5 mg by mouth once daily.      chlorthalidone (HYGROTEN) 25 MG Tab Take 1 tablet (25 mg total) by mouth once daily. (Patient not taking: Reported on 10/13/2020) 90 tablet 0    famotidine (PEPCID) 40 MG tablet Take 1 tablet (40 mg total) by mouth nightly as needed for Heartburn. (Patient not taking: Reported on 10/13/2020) 30 tablet 5     No current facility-administered medications on file prior to visit.        Allergies   Review of patient's allergies indicates:   Allergen Reactions    Penicillins        Review of Systems (Pertinent positives)  Review of Systems   Constitutional: Negative.    HENT: Negative.    Eyes: Negative.    Respiratory: Negative.    Cardiovascular: Negative.    Gastrointestinal: Negative.    Genitourinary: Positive for dysuria, frequency and urgency. Negative for flank pain and hematuria.   Musculoskeletal: Negative.    Skin: Negative.    Neurological: Negative.    Endo/Heme/Allergies: Negative.    Psychiatric/Behavioral: Negative.        /80 (BP Location: Left arm, Patient Position: Sitting, BP Method: Medium (Manual))   Pulse 78   Temp 98.9 °F (37.2 °C) (Oral)   Resp 18   Ht 5' 7" (1.702 m)   Wt 92 kg (202 lb 13.2 oz)   SpO2 97%   BMI 31.77 kg/m²     Physical Exam  Vitals signs reviewed.   Constitutional:       General: She is not in acute distress.     Appearance: Normal appearance. She is not ill-appearing, toxic-appearing or diaphoretic.   HENT:      Head: Normocephalic and atraumatic. "   Cardiovascular:      Rate and Rhythm: Normal rate and regular rhythm.      Pulses: Normal pulses.      Heart sounds: Normal heart sounds. No murmur. No friction rub. No gallop.    Pulmonary:      Effort: Pulmonary effort is normal. No respiratory distress.      Breath sounds: Normal breath sounds. No stridor. No wheezing, rhonchi or rales.   Chest:      Chest wall: No tenderness.   Abdominal:      General: Abdomen is flat. Bowel sounds are normal. There is no distension.      Palpations: Abdomen is soft. There is no mass.      Tenderness: There is no abdominal tenderness. There is no right CVA tenderness, left CVA tenderness, guarding or rebound.      Hernia: No hernia is present.   Skin:     General: Skin is warm and dry.      Capillary Refill: Capillary refill takes less than 2 seconds.   Neurological:      General: No focal deficit present.      Mental Status: She is alert and oriented to person, place, and time.          Assessment/Plan:  Guillermina Martinez is a 70 y.o. female who presents today for :    Guillermina was seen today for urinary frequency.    Diagnoses and all orders for this visit:    Acute cystitis with hematuria  -     sulfamethoxazole-trimethoprim 800-160mg (BACTRIM DS) 800-160 mg Tab; Take 1 tablet by mouth 2 (two) times daily. for 3 days  -     POCT URINE DIPSTICK WITHOUT MICROSCOPE  -     Urine culture  -     URINALYSIS     p.o. CT urine dip shows leukocytes and blood cells Given recurrence will repeat urine culture and urinalysis treat with Bactrim.  Recommended patient to stay hydrated and avoid bladder irritants like caffeine and alcohol.  Will follow-up with patient after urine studies if symptoms do not improve or recur she will need referral to urology.  She should follow-up in the meantime if symptoms acutely worsen.  Patient verbalized understanding.        This office note has been dictated.  This dictation has been generated using M-Modal Fluency Direct dictation; some phonetic errors may  occur.   My collaborating physician is Dr. Tal Meadows.

## 2020-10-30 LAB — BACTERIA UR CULT: ABNORMAL

## 2020-11-02 ENCOUNTER — TELEPHONE (OUTPATIENT)
Dept: FAMILY MEDICINE | Facility: CLINIC | Age: 70
End: 2020-11-02

## 2020-11-02 NOTE — TELEPHONE ENCOUNTER
Patient called and identification verified by name and date of birth.  Notified patient of urine culture results.  She took the Bactrim and is no longer having any urinary symptoms.  Recommended patient to monitor herself if she develops another UTI in the next 1-3 months I can refer her to urology.  Patient verbalized understanding of instructions.

## 2020-11-02 NOTE — TELEPHONE ENCOUNTER
Called patient to discuss in urine results unable to leave voice message will attempt to call again later

## 2021-01-20 DIAGNOSIS — Z12.11 COLON CANCER SCREENING: ICD-10-CM

## 2021-02-10 DIAGNOSIS — I10 HYPERTENSION, ESSENTIAL: ICD-10-CM

## 2021-09-12 ENCOUNTER — OFFICE VISIT (OUTPATIENT)
Dept: URGENT CARE | Facility: CLINIC | Age: 71
End: 2021-09-12
Payer: MEDICARE

## 2021-09-12 VITALS
HEIGHT: 67 IN | OXYGEN SATURATION: 97 % | TEMPERATURE: 99 F | SYSTOLIC BLOOD PRESSURE: 202 MMHG | DIASTOLIC BLOOD PRESSURE: 110 MMHG | RESPIRATION RATE: 18 BRPM | WEIGHT: 202 LBS | HEART RATE: 100 BPM | BODY MASS INDEX: 31.71 KG/M2

## 2021-09-12 DIAGNOSIS — N30.90 CYSTITIS: Primary | ICD-10-CM

## 2021-09-12 LAB
BILIRUB UR QL STRIP: NEGATIVE
GLUCOSE UR QL STRIP: NEGATIVE
KETONES UR QL STRIP: NEGATIVE
LEUKOCYTE ESTERASE UR QL STRIP: NEGATIVE
PH, POC UA: 5.5 (ref 5–8)
POC BLOOD, URINE: NEGATIVE
POC NITRATES, URINE: NEGATIVE
PROT UR QL STRIP: NEGATIVE
SP GR UR STRIP: 1.01 (ref 1–1.03)
UROBILINOGEN UR STRIP-ACNC: NORMAL (ref 0.1–1.1)

## 2021-09-12 PROCEDURE — 87186 SC STD MICRODIL/AGAR DIL: CPT | Performed by: INTERNAL MEDICINE

## 2021-09-12 PROCEDURE — 87086 URINE CULTURE/COLONY COUNT: CPT | Performed by: INTERNAL MEDICINE

## 2021-09-12 PROCEDURE — 81003 POCT URINALYSIS, DIPSTICK, MANUAL, W/O SCOPE: ICD-10-PCS | Mod: QW,S$GLB,, | Performed by: INTERNAL MEDICINE

## 2021-09-12 PROCEDURE — 87088 URINE BACTERIA CULTURE: CPT | Performed by: INTERNAL MEDICINE

## 2021-09-12 PROCEDURE — 81003 URINALYSIS AUTO W/O SCOPE: CPT | Mod: QW,S$GLB,, | Performed by: INTERNAL MEDICINE

## 2021-09-12 PROCEDURE — 99214 PR OFFICE/OUTPT VISIT, EST, LEVL IV, 30-39 MIN: ICD-10-PCS | Mod: 25,S$GLB,, | Performed by: INTERNAL MEDICINE

## 2021-09-12 PROCEDURE — 99214 OFFICE O/P EST MOD 30 MIN: CPT | Mod: 25,S$GLB,, | Performed by: INTERNAL MEDICINE

## 2021-09-12 PROCEDURE — 87077 CULTURE AEROBIC IDENTIFY: CPT | Performed by: INTERNAL MEDICINE

## 2021-09-12 RX ORDER — CEPHALEXIN 500 MG/1
500 CAPSULE ORAL EVERY 12 HOURS
Qty: 14 CAPSULE | Refills: 0 | Status: SHIPPED | OUTPATIENT
Start: 2021-09-12 | End: 2021-09-12 | Stop reason: ALTCHOICE

## 2021-09-12 RX ORDER — SULFAMETHOXAZOLE AND TRIMETHOPRIM 800; 160 MG/1; MG/1
1 TABLET ORAL 2 TIMES DAILY
Qty: 6 TABLET | Refills: 0 | Status: SHIPPED | OUTPATIENT
Start: 2021-09-12 | End: 2021-09-15

## 2021-09-14 ENCOUNTER — OFFICE VISIT (OUTPATIENT)
Dept: FAMILY MEDICINE | Facility: CLINIC | Age: 71
End: 2021-09-14
Payer: MEDICARE

## 2021-09-14 VITALS
BODY MASS INDEX: 32.04 KG/M2 | HEART RATE: 104 BPM | WEIGHT: 204.13 LBS | OXYGEN SATURATION: 96 % | DIASTOLIC BLOOD PRESSURE: 88 MMHG | SYSTOLIC BLOOD PRESSURE: 138 MMHG | HEIGHT: 67 IN | RESPIRATION RATE: 16 BRPM

## 2021-09-14 DIAGNOSIS — N30.00 ACUTE CYSTITIS WITHOUT HEMATURIA: ICD-10-CM

## 2021-09-14 DIAGNOSIS — M54.50 ACUTE MIDLINE LOW BACK PAIN WITHOUT SCIATICA: Primary | ICD-10-CM

## 2021-09-14 DIAGNOSIS — I10 HYPERTENSION, ESSENTIAL: ICD-10-CM

## 2021-09-14 PROCEDURE — 99999 PR PBB SHADOW E&M-EST. PATIENT-LVL III: ICD-10-PCS | Mod: PBBFAC,,, | Performed by: FAMILY MEDICINE

## 2021-09-14 PROCEDURE — 99214 PR OFFICE/OUTPT VISIT, EST, LEVL IV, 30-39 MIN: ICD-10-PCS | Mod: S$PBB,,, | Performed by: FAMILY MEDICINE

## 2021-09-14 PROCEDURE — 99214 OFFICE O/P EST MOD 30 MIN: CPT | Mod: S$PBB,,, | Performed by: FAMILY MEDICINE

## 2021-09-14 PROCEDURE — 99213 OFFICE O/P EST LOW 20 MIN: CPT | Mod: PBBFAC,PN | Performed by: FAMILY MEDICINE

## 2021-09-14 PROCEDURE — 99999 PR PBB SHADOW E&M-EST. PATIENT-LVL III: CPT | Mod: PBBFAC,,, | Performed by: FAMILY MEDICINE

## 2021-09-14 RX ORDER — IBUPROFEN 100 MG/5ML
1500 SUSPENSION, ORAL (FINAL DOSE FORM) ORAL DAILY
COMMUNITY

## 2021-09-14 RX ORDER — CLOBETASOL PROPIONATE 0.46 MG/ML
SOLUTION TOPICAL
COMMUNITY
Start: 2021-05-10 | End: 2021-09-14

## 2021-09-15 ENCOUNTER — TELEPHONE (OUTPATIENT)
Dept: URGENT CARE | Facility: CLINIC | Age: 71
End: 2021-09-15

## 2021-09-15 LAB — BACTERIA UR CULT: ABNORMAL

## 2022-03-29 ENCOUNTER — PES CALL (OUTPATIENT)
Dept: ADMINISTRATIVE | Facility: CLINIC | Age: 72
End: 2022-03-29
Payer: MEDICARE

## 2022-05-19 ENCOUNTER — OFFICE VISIT (OUTPATIENT)
Dept: FAMILY MEDICINE | Facility: CLINIC | Age: 72
End: 2022-05-19
Payer: MEDICARE

## 2022-05-19 ENCOUNTER — LAB VISIT (OUTPATIENT)
Dept: LAB | Facility: HOSPITAL | Age: 72
End: 2022-05-19
Attending: NURSE PRACTITIONER
Payer: MEDICARE

## 2022-05-19 VITALS
HEART RATE: 94 BPM | HEIGHT: 67 IN | WEIGHT: 205 LBS | SYSTOLIC BLOOD PRESSURE: 160 MMHG | BODY MASS INDEX: 32.18 KG/M2 | OXYGEN SATURATION: 98 % | TEMPERATURE: 98 F | DIASTOLIC BLOOD PRESSURE: 88 MMHG | RESPIRATION RATE: 18 BRPM

## 2022-05-19 DIAGNOSIS — N30.01 ACUTE CYSTITIS WITH HEMATURIA: Primary | ICD-10-CM

## 2022-05-19 DIAGNOSIS — I10 HYPERTENSION, ESSENTIAL: ICD-10-CM

## 2022-05-19 DIAGNOSIS — N30.01 ACUTE CYSTITIS WITH HEMATURIA: ICD-10-CM

## 2022-05-19 LAB
BILIRUB SERPL-MCNC: NEGATIVE MG/DL
BLOOD URINE, POC: NORMAL
CLARITY, POC UA: CLEAR
COLOR, POC UA: YELLOW
GLUCOSE UR QL STRIP: NORMAL
KETONES UR QL STRIP: NEGATIVE
LEUKOCYTE ESTERASE URINE, POC: NORMAL
NITRITE, POC UA: POSITIVE
PH, POC UA: 5
PROTEIN, POC: POSITIVE
SPECIFIC GRAVITY, POC UA: 1.02
UROBILINOGEN, POC UA: NORMAL

## 2022-05-19 PROCEDURE — 99999 PR PBB SHADOW E&M-EST. PATIENT-LVL III: CPT | Mod: PBBFAC,,, | Performed by: NURSE PRACTITIONER

## 2022-05-19 PROCEDURE — 87086 URINE CULTURE/COLONY COUNT: CPT | Performed by: NURSE PRACTITIONER

## 2022-05-19 PROCEDURE — 99214 OFFICE O/P EST MOD 30 MIN: CPT | Mod: S$PBB,,, | Performed by: NURSE PRACTITIONER

## 2022-05-19 PROCEDURE — 99999 PR PBB SHADOW E&M-EST. PATIENT-LVL III: ICD-10-PCS | Mod: PBBFAC,,, | Performed by: NURSE PRACTITIONER

## 2022-05-19 PROCEDURE — 81002 URINALYSIS NONAUTO W/O SCOPE: CPT | Mod: PBBFAC,PN | Performed by: NURSE PRACTITIONER

## 2022-05-19 PROCEDURE — 87088 URINE BACTERIA CULTURE: CPT | Performed by: NURSE PRACTITIONER

## 2022-05-19 PROCEDURE — 99213 OFFICE O/P EST LOW 20 MIN: CPT | Mod: PBBFAC,PN | Performed by: NURSE PRACTITIONER

## 2022-05-19 PROCEDURE — 87077 CULTURE AEROBIC IDENTIFY: CPT | Performed by: NURSE PRACTITIONER

## 2022-05-19 PROCEDURE — 87186 SC STD MICRODIL/AGAR DIL: CPT | Performed by: NURSE PRACTITIONER

## 2022-05-19 PROCEDURE — 99214 PR OFFICE/OUTPT VISIT, EST, LEVL IV, 30-39 MIN: ICD-10-PCS | Mod: S$PBB,,, | Performed by: NURSE PRACTITIONER

## 2022-05-19 RX ORDER — SULFAMETHOXAZOLE AND TRIMETHOPRIM 800; 160 MG/1; MG/1
1 TABLET ORAL 2 TIMES DAILY
Qty: 6 TABLET | Refills: 0 | Status: SHIPPED | OUTPATIENT
Start: 2022-05-19 | End: 2022-11-14

## 2022-05-19 NOTE — PROGRESS NOTES
Routine Office Visit    Patient Name: Guillermina Martinez    : 1950  MRN: [id number]    Chief Complaint:  Dysuria     Subjective:  Guillermina is a 71 y.o. female who presents today for:    1. Dysuria - patient who is known to me with a history of hypertension reports today for evaluation.  For the last 4 days has been dealing with intermittent dysuria and urinary pressure as well as urinary frequency.  No hematuria, fevers, chills, or flank pain.  She does report some mild mid lower back pain but no other acute concerns.  She has been taking cranberry and drinking lots of water to help with the symptoms.  She did do a home urine dipstick test which was positive for leukocytes.    Past Medical History  No past medical history on file.    Past Surgical History  No past surgical history on file.    Family History  No family history on file.    Social History  Social History     Socioeconomic History    Marital status: Single   Tobacco Use    Smoking status: Never Smoker    Smokeless tobacco: Never Used       Current Medications  Current Outpatient Medications on File Prior to Visit   Medication Sig Dispense Refill    ascorbic acid, vitamin C, (VITAMIN C) 1000 MG tablet Take 1,500 mg by mouth once daily.      ergocalciferol, vitamin D2, (VITAMIN D ORAL) Take 2,000 Int'l Units by mouth once daily.      folic acid/multivit-min/lutein (CENTRUM SILVER ORAL) Take 1 tablet by mouth.      loratadine (CLARITIN) 5 mg chewable tablet Take 5 mg by mouth once daily.      azelastine (ASTELIN) 137 mcg (0.1 %) nasal spray 1 spray (137 mcg total) by Nasal route 2 (two) times daily. 30 mL 0     No current facility-administered medications on file prior to visit.       Allergies   Review of patient's allergies indicates:   Allergen Reactions    Penicillins        Review of Systems (Pertinent positives)  Review of Systems   Constitutional: Negative for chills.   HENT: Negative.    Eyes: Negative.    Respiratory: Negative.   "  Cardiovascular: Negative.    Gastrointestinal: Negative for abdominal pain, diarrhea, heartburn, nausea and vomiting.   Genitourinary: Positive for dysuria and frequency. Negative for flank pain, hematuria and urgency.   Musculoskeletal: Positive for back pain. Negative for falls and joint pain.   Skin: Negative.    Neurological: Negative.    Endo/Heme/Allergies: Negative.    Psychiatric/Behavioral: Negative.        BP (!) 160/88 (BP Location: Left arm, Patient Position: Sitting, BP Method: Large (Manual))   Pulse 94   Temp 97.8 °F (36.6 °C) (Oral)   Resp 18   Ht 5' 7" (1.702 m)   Wt 93 kg (205 lb 0.4 oz)   SpO2 98%   BMI 32.11 kg/m²     Physical Exam  Vitals reviewed.   Constitutional:       General: She is not in acute distress.     Appearance: Normal appearance. She is not ill-appearing, toxic-appearing or diaphoretic.   Cardiovascular:      Rate and Rhythm: Normal rate and regular rhythm.      Pulses: Normal pulses.      Heart sounds: Normal heart sounds.   Pulmonary:      Effort: Pulmonary effort is normal.      Breath sounds: Normal breath sounds.   Abdominal:      General: Bowel sounds are normal. There is no distension.      Palpations: Abdomen is soft.      Tenderness: There is no abdominal tenderness. There is no right CVA tenderness or left CVA tenderness.   Musculoskeletal:         General: No swelling or tenderness. Normal range of motion.   Skin:     General: Skin is warm and dry.   Neurological:      Mental Status: She is alert and oriented to person, place, and time.   Psychiatric:         Mood and Affect: Mood normal.         Behavior: Behavior normal.          Assessment/Plan:  Guillermina Martinez is a 71 y.o. female who presents today for :    Diagnoses and all orders for this visit:    Acute cystitis with hematuria  -     POCT URINE DIPSTICK WITHOUT MICROSCOPE  -     Urine culture; Future  -     sulfamethoxazole-trimethoprim 800-160mg (BACTRIM DS) 800-160 mg Tab; Take 1 tablet by mouth 2 (two) " times daily.    Urine dipstick positive for leukocytes, hematuria, and nitrites.  Given current symptoms will treat with Bactrim at this time.  Check urine culture.  Home care measures discussed to prevent UTIs.    Hypertension, essential    Recommended patient follow up with her PCP for evaluation of her blood pressure, however she declines this at this time.    Follow up in office as needed.        This office note has been dictated.  This dictation has been generated using M-Modal Fluency Direct dictation; some phonetic errors may occur.   My collaborating physician is Dr. Tal Meadows.

## 2022-05-21 LAB — BACTERIA UR CULT: ABNORMAL

## 2022-08-16 ENCOUNTER — TELEPHONE (OUTPATIENT)
Dept: FAMILY MEDICINE | Facility: CLINIC | Age: 72
End: 2022-08-16
Payer: MEDICARE

## 2022-08-16 NOTE — TELEPHONE ENCOUNTER
Called the Patient to inform about the [facility] appointment.  No voicemail setup could not leave a message.

## 2022-11-08 ENCOUNTER — OFFICE VISIT (OUTPATIENT)
Dept: URGENT CARE | Facility: CLINIC | Age: 72
End: 2022-11-08
Payer: MEDICARE

## 2022-11-08 VITALS
SYSTOLIC BLOOD PRESSURE: 160 MMHG | WEIGHT: 205 LBS | BODY MASS INDEX: 32.18 KG/M2 | TEMPERATURE: 98 F | OXYGEN SATURATION: 97 % | HEIGHT: 67 IN | DIASTOLIC BLOOD PRESSURE: 92 MMHG | HEART RATE: 90 BPM | RESPIRATION RATE: 16 BRPM

## 2022-11-08 DIAGNOSIS — R39.9 UTI SYMPTOMS: ICD-10-CM

## 2022-11-08 DIAGNOSIS — R30.0 DYSURIA: Primary | ICD-10-CM

## 2022-11-08 LAB
BILIRUB UR QL STRIP: NEGATIVE
GLUCOSE UR QL STRIP: NEGATIVE
KETONES UR QL STRIP: NEGATIVE
LEUKOCYTE ESTERASE UR QL STRIP: NEGATIVE
PH, POC UA: 5.5
POC BLOOD, URINE: NEGATIVE
POC NITRATES, URINE: NEGATIVE
PROT UR QL STRIP: NEGATIVE
SP GR UR STRIP: 1.01 (ref 1–1.03)
UROBILINOGEN UR STRIP-ACNC: NORMAL (ref 0.1–1.1)

## 2022-11-08 PROCEDURE — 99213 OFFICE O/P EST LOW 20 MIN: CPT | Mod: S$GLB,,, | Performed by: NURSE PRACTITIONER

## 2022-11-08 PROCEDURE — 99213 PR OFFICE/OUTPT VISIT, EST, LEVL III, 20-29 MIN: ICD-10-PCS | Mod: S$GLB,,, | Performed by: NURSE PRACTITIONER

## 2022-11-08 PROCEDURE — 87086 URINE CULTURE/COLONY COUNT: CPT | Performed by: NURSE PRACTITIONER

## 2022-11-08 PROCEDURE — 81003 POCT URINALYSIS, DIPSTICK, AUTOMATED, W/O SCOPE: ICD-10-PCS | Mod: QW,S$GLB,, | Performed by: NURSE PRACTITIONER

## 2022-11-08 PROCEDURE — 81003 URINALYSIS AUTO W/O SCOPE: CPT | Mod: QW,S$GLB,, | Performed by: NURSE PRACTITIONER

## 2022-11-08 RX ORDER — CLOBETASOL PROPIONATE 0.46 MG/ML
SOLUTION TOPICAL
COMMUNITY
Start: 2022-10-24 | End: 2023-02-24

## 2022-11-08 RX ORDER — SULFAMETHOXAZOLE AND TRIMETHOPRIM 800; 160 MG/1; MG/1
1 TABLET ORAL 2 TIMES DAILY
Qty: 6 TABLET | Refills: 0 | Status: SHIPPED | OUTPATIENT
Start: 2022-11-08 | End: 2022-11-11

## 2022-11-08 NOTE — PROGRESS NOTES
"Subjective:       Patient ID: Guillermina Martinez is a 72 y.o. female.    Vitals:  height is 5' 7" (1.702 m) and weight is 93 kg (205 lb). Her oral temperature is 98.3 °F (36.8 °C). Her blood pressure is 160/92 (abnormal) and her pulse is 90. Her respiration is 16 and oxygen saturation is 97%.     Chief Complaint: Urinary Tract Infection    72-year-old female presents to clinic for evaluation of urinary frequency, urgency, and pelvic pressure when urinating for the last 2-3 days.  Patient reports a history of frequent UTIs.  States that symptoms feel similar than previous infections.  She denies fever.  She denies abdominal pain/nausea/vomiting.  She is awake and alert, answers questions appropriately, no acute distress noted on today's visit.    Urinary Tract Infection   This is a new problem. Episode onset: 4 days ago. The problem has been unchanged. The quality of the pain is described as burning. The patient is experiencing no pain. There has been no fever. She is Not sexually active. There is No history of pyelonephritis. Associated symptoms include frequency and urgency. Pertinent negatives include no chills, flank pain, hematuria, nausea or vomiting. She has tried nothing for the symptoms. The treatment provided no relief. Her past medical history is significant for recurrent UTIs. There is no history of catheterization, diabetes insipidus, diabetes mellitus, genitourinary reflux, hypertension, kidney stones, a single kidney, STD, urinary stasis or a urological procedure.     Constitution: Negative for activity change, appetite change, chills, sweating, fatigue and fever.   Cardiovascular:  Negative for chest pain.   Gastrointestinal:  Negative for abdominal pain, nausea and vomiting.   Genitourinary:  Positive for dysuria, frequency and urgency. Negative for flank pain and hematuria.   Neurological:  Negative for dizziness.     Objective:      Physical Exam   Constitutional: She is oriented to person, place, and " time. She appears well-developed.  Non-toxic appearance. She does not appear ill. No distress.   HENT:   Head: Normocephalic and atraumatic.   Ears:   Right Ear: External ear normal.   Left Ear: External ear normal.   Nose: Nose normal. No nasal deformity. No epistaxis.   Mouth/Throat: Oropharynx is clear and moist and mucous membranes are normal.   Eyes: Lids are normal.   Neck: Trachea normal and phonation normal. Neck supple.   Cardiovascular: Normal rate and normal pulses.   Pulmonary/Chest: Effort normal and breath sounds normal. No respiratory distress.   Abdominal: Normal appearance. She exhibits no distension. There is no abdominal tenderness.   Neurological: She is alert and oriented to person, place, and time.   Skin: Skin is warm, dry, intact and not diaphoretic.   Psychiatric: Her speech is normal and behavior is normal. Mood, judgment and thought content normal.   Nursing note and vitals reviewed.      Results for orders placed or performed in visit on 11/08/22   POCT Urinalysis, Dipstick, Automated, W/O Scope   Result Value Ref Range    POC Blood, Urine Negative Negative    POC Bilirubin, Urine Negative Negative    POC Urobilinogen, Urine normal 0.1 - 1.1    POC Ketones, Urine Negative Negative    POC Protein, Urine Negative Negative    POC Nitrates, Urine Negative Negative    POC Glucose, Urine Negative Negative    pH, UA 5.5     POC Specific Gravity, Urine 1.015 1.003 - 1.029    POC Leukocytes, Urine Negative Negative       Assessment:       1. Dysuria    2. UTI symptoms            Plan:         Dysuria  -     POCT Urinalysis, Dipstick, Automated, W/O Scope    UTI symptoms  -     Urine culture  -     sulfamethoxazole-trimethoprim 800-160mg (BACTRIM DS) 800-160 mg Tab; Take 1 tablet by mouth 2 (two) times daily. for 3 days  Dispense: 6 tablet; Refill: 0       - discussed normal in clinic urinalysis, however given history of frequent UTIs, and current symptoms, will treat with Bactrim.  Urine culture  pending, will call with results.  Follow-up with PCP.  Patient verbalized understanding and is in agreement with plan.    Patient Instructions   - You must understand that you have received an Urgent Care treatment only and that you may be released before all of your medical problems are known or treated.   - You, the patient, will arrange for follow up care as instructed.   - If your condition worsens or fails to improve we recommend that you receive another evaluation at the ER immediately or contact your PCP to discuss your concerns or return here.

## 2022-11-09 LAB — BACTERIA UR CULT: NO GROWTH

## 2022-11-11 ENCOUNTER — TELEPHONE (OUTPATIENT)
Dept: URGENT CARE | Facility: CLINIC | Age: 72
End: 2022-11-11
Payer: MEDICARE

## 2022-11-11 ENCOUNTER — TELEPHONE (OUTPATIENT)
Dept: FAMILY MEDICINE | Facility: CLINIC | Age: 72
End: 2022-11-11
Payer: MEDICARE

## 2022-11-11 NOTE — TELEPHONE ENCOUNTER
----- Message from Gurwinder Troy NP sent at 11/11/2022  8:34 AM CST -----  Patient will need an appointment with someone to discuss any medications.  Please schedule with any provider here or at another clinic  ----- Message -----  From: Sari Vanegas  Sent: 11/11/2022   8:27 AM CST  To: [facility] Staff    Type: Patient Call Back    Who called: self    What is the request in detail: please call patient concerning medication she was given at Urgent Care    Can the clinic reply by [facility]? no    Would the patient rather a call back or a response via [facility]? call    Best call back number: .[phone] (home)

## 2022-11-11 NOTE — TELEPHONE ENCOUNTER
Notified patient that her UA and urine culture were negative results - patient stated she completed her course of medication and is not experiencing any symptoms now.  Assisted in setting an appointment to reestablish care with Dr. Hinojosa.

## 2022-11-12 NOTE — TELEPHONE ENCOUNTER
Called patient to discuss negative urine culture results.  Patient completed previously prescribed Bactrim.  She is currently asymptomatic.  Discussed follow-up with PCP as needed.  All questions addressed on this telephone encounter.

## 2022-11-14 ENCOUNTER — OFFICE VISIT (OUTPATIENT)
Dept: FAMILY MEDICINE | Facility: CLINIC | Age: 72
End: 2022-11-14
Payer: MEDICARE

## 2022-11-14 VITALS
BODY MASS INDEX: 31.83 KG/M2 | TEMPERATURE: 98 F | DIASTOLIC BLOOD PRESSURE: 80 MMHG | SYSTOLIC BLOOD PRESSURE: 140 MMHG | OXYGEN SATURATION: 95 % | RESPIRATION RATE: 17 BRPM | WEIGHT: 202.81 LBS | HEIGHT: 67 IN | HEART RATE: 103 BPM

## 2022-11-14 DIAGNOSIS — N30.00 ACUTE CYSTITIS WITHOUT HEMATURIA: Primary | ICD-10-CM

## 2022-11-14 LAB
BILIRUB SERPL-MCNC: NEGATIVE MG/DL
BLOOD URINE, POC: ABNORMAL
CLARITY, POC UA: ABNORMAL
COLOR, POC UA: ABNORMAL
GLUCOSE UR QL STRIP: NORMAL
KETONES UR QL STRIP: ABNORMAL
LEUKOCYTE ESTERASE URINE, POC: ABNORMAL
NITRITE, POC UA: ABNORMAL
PH, POC UA: 5
PROTEIN, POC: ABNORMAL
SPECIFIC GRAVITY, POC UA: 1.02
UROBILINOGEN, POC UA: NORMAL

## 2022-11-14 PROCEDURE — 99999 PR PBB SHADOW E&M-EST. PATIENT-LVL III: ICD-10-PCS | Mod: PBBFAC,,, | Performed by: STUDENT IN AN ORGANIZED HEALTH CARE EDUCATION/TRAINING PROGRAM

## 2022-11-14 PROCEDURE — 99213 OFFICE O/P EST LOW 20 MIN: CPT | Mod: PBBFAC,PO | Performed by: STUDENT IN AN ORGANIZED HEALTH CARE EDUCATION/TRAINING PROGRAM

## 2022-11-14 PROCEDURE — 99999 PR PBB SHADOW E&M-EST. PATIENT-LVL III: CPT | Mod: PBBFAC,,, | Performed by: STUDENT IN AN ORGANIZED HEALTH CARE EDUCATION/TRAINING PROGRAM

## 2022-11-14 PROCEDURE — 99213 PR OFFICE/OUTPT VISIT, EST, LEVL III, 20-29 MIN: ICD-10-PCS | Mod: S$PBB,,, | Performed by: STUDENT IN AN ORGANIZED HEALTH CARE EDUCATION/TRAINING PROGRAM

## 2022-11-14 PROCEDURE — 99213 OFFICE O/P EST LOW 20 MIN: CPT | Mod: S$PBB,,, | Performed by: STUDENT IN AN ORGANIZED HEALTH CARE EDUCATION/TRAINING PROGRAM

## 2022-11-14 PROCEDURE — 81002 URINALYSIS NONAUTO W/O SCOPE: CPT | Mod: PBBFAC,PO | Performed by: STUDENT IN AN ORGANIZED HEALTH CARE EDUCATION/TRAINING PROGRAM

## 2022-11-14 RX ORDER — NITROFURANTOIN 25; 75 MG/1; MG/1
100 CAPSULE ORAL 2 TIMES DAILY
Qty: 14 CAPSULE | Refills: 0 | Status: SHIPPED | OUTPATIENT
Start: 2022-11-14 | End: 2022-11-21

## 2022-11-14 NOTE — PROGRESS NOTES
SUBJECTIVE     Chief Complaint   Patient presents with    Pain    Urinary Frequency              HPI  Guillermina Martinez is a 72 y.o. female with multiple medical diagnoses as listed in the medical history and problem list that presents for evaluation of calf pain and UTI.    Pt treated for UTI w/ bactrim last week, now asymptomatic. She did run to the bathroom yesterday and felt symptomatic. She was notably drinking lots of water and taking azo pills for this.    PAST MEDICAL HISTORY:  History reviewed. No pertinent past medical history.    PAST SURGICAL HISTORY:  History reviewed. No pertinent surgical history.    FAMILY HISTORY:  History reviewed. No pertinent family history.    ALLERGIES AND MEDICATIONS: updated and reviewed.  Review of patient's allergies indicates:   Allergen Reactions    Penicillins      Current Outpatient Medications   Medication Sig Dispense Refill    ascorbic acid, vitamin C, (VITAMIN C) 1000 MG tablet Take 1,500 mg by mouth once daily.      azelastine (ASTELIN) 137 mcg (0.1 %) nasal spray 1 spray (137 mcg total) by Nasal route 2 (two) times daily. 30 mL 0    ergocalciferol, vitamin D2, (VITAMIN D ORAL) Take 2,000 Int'l Units by mouth once daily.      folic acid/multivit-min/lutein (CENTRUM SILVER ORAL) Take 1 tablet by mouth.      loratadine (CLARITIN) 5 mg chewable tablet Take 5 mg by mouth once daily.      clobetasoL (TEMOVATE) 0.05 % external solution SMARTSI Milliliter(s) Topical 1-2 Times Daily      nitrofurantoin, macrocrystal-monohydrate, (MACROBID) 100 MG capsule Take 1 capsule (100 mg total) by mouth 2 (two) times daily. for 7 days 14 capsule 0     No current facility-administered medications for this visit.       ROS  Review of Systems   Constitutional:  Negative for chills, fatigue and fever.   HENT:  Negative for rhinorrhea and sore throat.    Respiratory:  Negative for cough and shortness of breath.    Cardiovascular:  Negative for chest pain and palpitations.   Gastrointestinal:  " Negative for constipation, diarrhea, nausea and vomiting.   Genitourinary:  Positive for dysuria, frequency and urgency. Negative for difficulty urinating, dyspareunia, enuresis, flank pain and hematuria.   Musculoskeletal:  Negative for joint swelling.   Skin:  Negative for rash and wound.   Neurological:  Negative for light-headedness and headaches.   Psychiatric/Behavioral:  Negative for dysphoric mood and sleep disturbance. The patient is not nervous/anxious.        OBJECTIVE     Physical Exam  Vitals:    11/14/22 1333   BP: (!) 140/80   Pulse: 103   Resp: 17   Temp: 98.1 °F (36.7 °C)    Body mass index is 31.77 kg/m².  Weight: 92 kg (202 lb 13.2 oz)   Height: 5' 7" (170.2 cm)     Physical Exam  Vitals reviewed.   Constitutional:       General: She is not in acute distress.  HENT:      Right Ear: External ear normal.      Left Ear: External ear normal.      Nose: Nose normal.      Mouth/Throat:      Mouth: Mucous membranes are moist.   Eyes:      Extraocular Movements: Extraocular movements intact.      Conjunctiva/sclera: Conjunctivae normal.      Pupils: Pupils are equal, round, and reactive to light.   Pulmonary:      Effort: Pulmonary effort is normal.   Abdominal:      General: There is no distension.      Palpations: Abdomen is soft.   Musculoskeletal:         General: No swelling. Normal range of motion.      Cervical back: Normal range of motion.   Skin:     General: Skin is warm and dry.      Findings: No rash.   Neurological:      General: No focal deficit present.      Mental Status: She is alert and oriented to person, place, and time.   Psychiatric:         Mood and Affect: Mood normal.         Behavior: Behavior normal.         Health Maintenance         Date Due Completion Date    COVID-19 Vaccine (1) Never done ---    TETANUS VACCINE Never done ---    Mammogram Never done ---    DEXA Scan Never done ---    Colorectal Cancer Screening Never done ---    Shingles Vaccine (1 of 2) Never done ---    " Pneumococcal Vaccines (Age 65+) (1 - PCV) Never done ---    Influenza Vaccine (1) Never done ---    Lipid Panel 12/31/2024 12/31/2019              ASSESSMENT     72 y.o. female with     1. Acute cystitis without hematuria        PLAN:     1. Acute cystitis without hematuria  - Pt w/ LE on UA, will treat for UTI. F/u next week if not resolved and consider tx for overactive bladder if not resolving.  - POCT URINE DIPSTICK WITHOUT MICROSCOPE  - nitrofurantoin, macrocrystal-monohydrate, (MACROBID) 100 MG capsule; Take 1 capsule (100 mg total) by mouth 2 (two) times daily. for 7 days  Dispense: 14 capsule; Refill: 0         Phoebe Owens MD  11/14/2022 2:11 PM        No follow-ups on file.

## 2022-11-15 ENCOUNTER — TELEPHONE (OUTPATIENT)
Dept: FAMILY MEDICINE | Facility: CLINIC | Age: 72
End: 2022-11-15
Payer: MEDICARE

## 2022-11-15 NOTE — TELEPHONE ENCOUNTER
Pt states the pharmacy was closed and she thought she would need the medication sent somewhere else but they just opened; she will call back if she has any other problems

## 2022-11-15 NOTE — TELEPHONE ENCOUNTER
----- Message from Radha Boothe sent at 11/15/2022  7:04 AM CST -----  Regarding: Patient call back  Who called:RICKY GUPTA [[id number]]    What is the request in detail: Patient is requesting a call back. She states she would like a call back from the provider regarding her  nitrofurantoin, macrocrystal-monohydrate, (MACROBID) 100 MG capsule script.  Please advise.    Can the clinic reply by [facility]? No    Best call back number: [phone]    Additional Information: N/A

## 2023-02-24 ENCOUNTER — OFFICE VISIT (OUTPATIENT)
Dept: FAMILY MEDICINE | Facility: CLINIC | Age: 73
End: 2023-02-24
Payer: MEDICARE

## 2023-02-24 VITALS
RESPIRATION RATE: 18 BRPM | HEIGHT: 67 IN | TEMPERATURE: 98 F | OXYGEN SATURATION: 99 % | SYSTOLIC BLOOD PRESSURE: 140 MMHG | BODY MASS INDEX: 33.12 KG/M2 | WEIGHT: 211 LBS | HEART RATE: 78 BPM | DIASTOLIC BLOOD PRESSURE: 84 MMHG
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
## 2023-02-24 DIAGNOSIS — M54.50 ACUTE RIGHT-SIDED LOW BACK PAIN WITHOUT SCIATICA: ICD-10-CM

## 2023-02-24 PROCEDURE — 99214 OFFICE O/P EST MOD 30 MIN: CPT | Mod: PBBFAC,PO | Performed by: NURSE PRACTITIONER

## 2023-02-24 PROCEDURE — 99213 OFFICE O/P EST LOW 20 MIN: CPT | Mod: S$PBB,,, | Performed by: NURSE PRACTITIONER

## 2023-02-24 PROCEDURE — 99213 PR OFFICE/OUTPT VISIT, EST, LEVL III, 20-29 MIN: ICD-10-PCS | Mod: S$PBB,,, | Performed by: NURSE PRACTITIONER

## 2023-02-24 PROCEDURE — 99999 PR PBB SHADOW E&M-EST. PATIENT-LVL IV: CPT | Mod: PBBFAC,,, | Performed by: NURSE PRACTITIONER

## 2023-02-24 PROCEDURE — 99999 PR PBB SHADOW E&M-EST. PATIENT-LVL IV: ICD-10-PCS | Mod: PBBFAC,,, | Performed by: NURSE PRACTITIONER

## 2023-02-24 RX ORDER — DICLOFENAC SODIUM 10 MG/G
2 GEL TOPICAL DAILY PRN
Qty: 100 G | Refills: 1 | Status: SHIPPED | OUTPATIENT
Start: 2023-02-24

## 2023-02-24 NOTE — PROGRESS NOTES
Health Maintenance Due   Topic     COVID-19 Vaccine (1) Not offered at this office    TETANUS VACCINE  Consult pcp    Mammogram  Pending order    DEXA Scan  Pending order    Colorectal Cancer Screening  Pending order    Shingles Vaccine (1 of 2)  hx chicken pox. Notified pt can get vaccine at pharmacy    Pneumococcal Vaccines (Age 65+) (1 - PCV)     Influenza Vaccine (1) N/a

## 2023-02-24 NOTE — PROGRESS NOTES
"Subjective:       Patient ID: Guillermina Martinez is a 72 y.o. female.    Chief Complaint: Back Pain    HPI     Guillermina Martinez is a 72 y.o. female patient that presents to clinic with complaints of back pain secondary to a fall. Past medical and surgical history reviewed. No PCP. She is new to me. Reports she plays pickleball regularly at her local gym and fell and hit the bleachers a little over a week ago. Denies hitting her head or chest. States symptoms gradually improved, however after a day of chores and working symptoms flared up.      Back Pain  This is a new problem. The current episode started 1 to 4 weeks ago. The problem has been waxing and waning since onset. The pain is present in the lumbar spine and gluteal. The pain is Worse during the day. The symptoms are aggravated by sitting. Risk factors: Fall. She has tried ice, NSAIDs and heat for the symptoms. The treatment provided mild relief.     ROS as listed.   History reviewed. No pertinent past medical history.   History reviewed. No pertinent surgical history.   History reviewed. No pertinent family history.   Review of patient's allergies indicates:   Allergen Reactions    Penicillins      Review of Systems   Musculoskeletal:  Positive for back pain.     Objective:      Vitals:    02/24/23 1325   BP: (!) 140/84   BP Location: Left arm   Patient Position: Sitting   BP Method: Small (Manual)   Pulse: 78   Resp: 18   Temp: 97.9 °F (36.6 °C)   TempSrc: Oral   SpO2: 99%   Weight: 95.7 kg (210 lb 15.7 oz)   Height: 5' 7" (1.702 m)      Physical Exam  Vitals and nursing note reviewed.   Constitutional:       General: She is not in acute distress.     Appearance: Normal appearance.   HENT:      Head: Normocephalic and atraumatic.   Eyes:      Extraocular Movements: Extraocular movements intact.      Conjunctiva/sclera: Conjunctivae normal.      Pupils: Pupils are equal, round, and reactive to light.   Pulmonary:      Effort: Pulmonary effort is normal. No respiratory " distress.   Musculoskeletal:      Cervical back: Normal range of motion.      Lumbar back: Tenderness present. Normal range of motion.      Right hip: Tenderness present.   Skin:     Findings: Bruising (noted to right hip, rt upper buttocks, and right lumbar area) present.   Neurological:      Mental Status: She is alert and oriented to person, place, and time.   Psychiatric:         Mood and Affect: Mood normal.         Behavior: Behavior normal.       Lab Results   Component Value Date    WBC 8.78 12/31/2019    HGB 15.1 12/31/2019    HCT 45.2 12/31/2019     12/31/2019    CHOL 274 (H) 12/31/2019    TRIG 524 (H) 12/31/2019    HDL 38 (L) 12/31/2019    ALT 14 12/31/2019    AST 19 12/31/2019     (L) 12/31/2019    K 3.9 12/31/2019     12/31/2019    CREATININE 0.7 12/31/2019    BUN 18 12/31/2019    CO2 26 12/31/2019    TSH 1.748 12/31/2019      Assessment:       1. Fall, initial encounter    2. Acute right-sided low back pain without sciatica          Plan:       Fall, initial encounter  -     X-Ray Lumbar Spine AP And Lateral; Future; Expected date: 02/24/2023  -     diclofenac sodium (VOLTAREN) 1 % Gel; Apply 2 g topically daily as needed (pain).  Dispense: 100 g; Refill: 1    Acute right-sided low back pain without sciatica  Offered NSAID and muscle relaxant to patient to treat symptoms, she declined.   Recommend alternating Advil with acetaminophen for pain. Take with food.   Avoid aspirin and advil for risk of GI bleed.   Rest, avoid pickleball for at least 1-2 weeks.   Use heat application prn.   -     X-Ray Lumbar Spine AP And Lateral; Future; Expected date: 02/24/2023  -     diclofenac sodium (VOLTAREN) 1 % Gel; Apply 2 g topically daily as needed (pain).  Dispense: 100 g; Refill: 1      Medication List with Changes/Refills   New Medications    DICLOFENAC SODIUM (VOLTAREN) 1 % GEL    Apply 2 g topically daily as needed (pain).   Current Medications    ASCORBIC ACID, VITAMIN C, (VITAMIN C) 1000  MG TABLET    Take 1,500 mg by mouth once daily.    ERGOCALCIFEROL, VITAMIN D2, (VITAMIN D ORAL)    Take 2,000 Int'l Units by mouth once daily.    LORATADINE (CLARITIN) 5 MG CHEWABLE TABLET    Take 5 mg by mouth once daily.   Discontinued Medications    AZELASTINE (ASTELIN) 137 MCG (0.1 %) NASAL SPRAY    1 spray (137 mcg total) by Nasal route 2 (two) times daily.    CLOBETASOL (TEMOVATE) 0.05 % EXTERNAL SOLUTION    SMARTSI Milliliter(s) Topical 1-2 Times Daily    FOLIC ACID/MULTIVIT-MIN/LUTEIN (CENTRUM SILVER ORAL)    Take 1 tablet by mouth.         Recommend establish care with PCP in [facility].     Follow up if symptoms worsen or fail to improve.      Chely Savage, DNP, APRN, FNP-C  [facility]

## 2023-10-30 ENCOUNTER — OFFICE VISIT (OUTPATIENT)
Dept: FAMILY MEDICINE | Facility: CLINIC | Age: 73
End: 2023-10-30
Payer: MEDICARE

## 2023-10-30 VITALS
HEIGHT: 61 IN | DIASTOLIC BLOOD PRESSURE: 90 MMHG | BODY MASS INDEX: 40.91 KG/M2 | RESPIRATION RATE: 16 BRPM | SYSTOLIC BLOOD PRESSURE: 174 MMHG | TEMPERATURE: 98 F | WEIGHT: 216.69 LBS | HEART RATE: 87 BPM | OXYGEN SATURATION: 98 %

## 2023-10-30 DIAGNOSIS — N30.00 ACUTE CYSTITIS WITHOUT HEMATURIA: Primary | ICD-10-CM

## 2023-10-30 DIAGNOSIS — I10 HYPERTENSION, ESSENTIAL: ICD-10-CM

## 2023-10-30 LAB
BACTERIA #/AREA URNS HPF: ABNORMAL /HPF
BILIRUB SERPL-MCNC: NEGATIVE MG/DL
BILIRUB UR QL STRIP: NEGATIVE
BLOOD URINE, POC: NEGATIVE
CLARITY UR: CLEAR
CLARITY, POC UA: CLEAR
COLOR UR: YELLOW
COLOR, POC UA: YELLOW
GLUCOSE UR QL STRIP: NEGATIVE
GLUCOSE UR QL STRIP: NORMAL
HGB UR QL STRIP: NEGATIVE
KETONES UR QL STRIP: NEGATIVE
KETONES UR QL STRIP: NEGATIVE
LEUKOCYTE ESTERASE UR QL STRIP: ABNORMAL
LEUKOCYTE ESTERASE URINE, POC: NORMAL
MICROSCOPIC COMMENT: ABNORMAL
NITRITE UR QL STRIP: NEGATIVE
NITRITE, POC UA: NEGATIVE
PH UR STRIP: 6 [PH] (ref 5–8)
PH, POC UA: 5
PROT UR QL STRIP: NEGATIVE
PROTEIN, POC: NEGATIVE
RBC #/AREA URNS HPF: 1 /HPF (ref 0–4)
SP GR UR STRIP: 1.01 (ref 1–1.03)
SPECIFIC GRAVITY, POC UA: 1.02
URN SPEC COLLECT METH UR: ABNORMAL
UROBILINOGEN UR STRIP-ACNC: NEGATIVE EU/DL
UROBILINOGEN, POC UA: NORMAL
WBC #/AREA URNS HPF: 16 /HPF (ref 0–5)

## 2023-10-30 PROCEDURE — 81002 URINALYSIS NONAUTO W/O SCOPE: CPT | Mod: 59,PBBFAC,PN | Performed by: NURSE PRACTITIONER

## 2023-10-30 PROCEDURE — 99999PBSHW POCT URINE DIPSTICK WITHOUT MICROSCOPE: Mod: PBBFAC,,,

## 2023-10-30 PROCEDURE — 99999PBSHW POCT URINE DIPSTICK WITHOUT MICROSCOPE: ICD-10-PCS | Mod: PBBFAC,,,

## 2023-10-30 PROCEDURE — 99214 OFFICE O/P EST MOD 30 MIN: CPT | Mod: PBBFAC,PN | Performed by: NURSE PRACTITIONER

## 2023-10-30 PROCEDURE — 81000 URINALYSIS NONAUTO W/SCOPE: CPT | Performed by: NURSE PRACTITIONER

## 2023-10-30 PROCEDURE — 87088 URINE BACTERIA CULTURE: CPT | Performed by: NURSE PRACTITIONER

## 2023-10-30 PROCEDURE — 99214 PR OFFICE/OUTPT VISIT, EST, LEVL IV, 30-39 MIN: ICD-10-PCS | Mod: S$PBB,,, | Performed by: NURSE PRACTITIONER

## 2023-10-30 PROCEDURE — 99999 PR PBB SHADOW E&M-EST. PATIENT-LVL IV: CPT | Mod: PBBFAC,,, | Performed by: NURSE PRACTITIONER

## 2023-10-30 PROCEDURE — 87077 CULTURE AEROBIC IDENTIFY: CPT | Performed by: NURSE PRACTITIONER

## 2023-10-30 PROCEDURE — 87086 URINE CULTURE/COLONY COUNT: CPT | Performed by: NURSE PRACTITIONER

## 2023-10-30 PROCEDURE — 99214 OFFICE O/P EST MOD 30 MIN: CPT | Mod: S$PBB,,, | Performed by: NURSE PRACTITIONER

## 2023-10-30 PROCEDURE — 87186 SC STD MICRODIL/AGAR DIL: CPT | Performed by: NURSE PRACTITIONER

## 2023-10-30 PROCEDURE — 99999 PR PBB SHADOW E&M-EST. PATIENT-LVL IV: ICD-10-PCS | Mod: PBBFAC,,, | Performed by: NURSE PRACTITIONER

## 2023-10-30 RX ORDER — SULFAMETHOXAZOLE AND TRIMETHOPRIM 800; 160 MG/1; MG/1
1 TABLET ORAL 2 TIMES DAILY
Qty: 10 TABLET | Refills: 0 | Status: SHIPPED | OUTPATIENT
Start: 2023-10-30 | End: 2023-11-20

## 2023-10-30 NOTE — PROGRESS NOTES
Routine Office Visit    Patient Name: Guillermina Martinez    : 1950  MRN: [id number]    Chief Complaint:  Dysuria    Subjective:  Guillermina is a 73 y.o. female who presents today for:    Dysuria - patient who is known to me with a history of untreated hypertension reports today for evaluation.  For the last 3 days has been having urinary symptoms of dysuria and frequency which is worse at night.  She took an azo yesterday which did help somewhat with the symptoms.  Denies any fevers or chills or overt hematuria.    She has a history of hypertension but does not take anything for this.  She states that she usually avoids going to the doctor.  She has not had a mammogram or bone density scan.  She was prescribed chlorthalidone the past but did not take this.  She states that she checks her BP at home but only sparingly.    This is the extent of her concerns at this time.    Past Medical History  History reviewed. No pertinent past medical history.    Family History  No family history on file.    Current Medications  Current Outpatient Medications on File Prior to Visit   Medication Sig Dispense Refill    ascorbic acid, vitamin C, (VITAMIN C) 1000 MG tablet Take 1,500 mg by mouth once daily.      diclofenac sodium (VOLTAREN) 1 % Gel Apply 2 g topically daily as needed (pain). 100 g 1    ergocalciferol, vitamin D2, (VITAMIN D ORAL) Take 2,000 Int'l Units by mouth once daily.      loratadine (CLARITIN) 5 mg chewable tablet Take 5 mg by mouth once daily.       No current facility-administered medications on file prior to visit.       Allergies   Review of patient's allergies indicates:   Allergen Reactions    Penicillins        Review of Systems (Pertinent positives)  Review of Systems   Constitutional: Negative.  Negative for chills and fever.   HENT: Negative.  Negative for congestion, sinus pain and sore throat.    Eyes: Negative.    Respiratory:  Negative for cough, shortness of breath and wheezing.    Cardiovascular:   "Negative for chest pain, palpitations, orthopnea and claudication.   Gastrointestinal: Negative.  Negative for abdominal pain, diarrhea, nausea and vomiting.   Genitourinary:  Positive for dysuria and frequency. Negative for flank pain, hematuria and urgency.   Musculoskeletal: Negative.  Negative for back pain, joint pain and neck pain.   Skin: Negative.    Neurological: Negative.  Negative for dizziness, tingling, loss of consciousness and headaches.   Endo/Heme/Allergies: Negative.    Psychiatric/Behavioral: Negative.         BP (!) 174/90   Pulse 87   Temp 98.1 °F (36.7 °C) (Oral)   Resp 16   Ht 5' 1" (1.549 m)   Wt 98.3 kg (216 lb 11.4 oz)   SpO2 98%   BMI 40.95 kg/m²     Physical Exam  Vitals reviewed.   Constitutional:       General: She is not in acute distress.     Appearance: Normal appearance. She is not ill-appearing, toxic-appearing or diaphoretic.   HENT:      Head: Normocephalic and atraumatic.   Cardiovascular:      Rate and Rhythm: Normal rate and regular rhythm.      Pulses: Normal pulses.      Heart sounds: Normal heart sounds.   Pulmonary:      Effort: Pulmonary effort is normal. No respiratory distress.      Breath sounds: Normal breath sounds. No wheezing.   Abdominal:      General: Bowel sounds are normal. There is no distension.      Palpations: Abdomen is soft.      Tenderness: There is no abdominal tenderness.   Musculoskeletal:         General: No swelling, tenderness or deformity. Normal range of motion.   Skin:     General: Skin is warm and dry.      Capillary Refill: Capillary refill takes less than 2 seconds.   Neurological:      General: No focal deficit present.      Mental Status: She is alert and oriented to person, place, and time.   Psychiatric:         Mood and Affect: Mood normal.         Behavior: Behavior normal.          Assessment/Plan:  Guillermina Martinez is a 73 y.o. female who presents today for :    Guillermina was seen today for burning urination .    Diagnoses and all " orders for this visit:    Acute cystitis without hematuria  -     POCT URINE DIPSTICK WITHOUT MICROSCOPE  -     Urine culture; Future  -     Urine culture  -     Urinalysis; Future  -     sulfamethoxazole-trimethoprim 800-160mg (BACTRIM DS) 800-160 mg Tab; Take 1 tablet by mouth 2 (two) times daily.    Point of Care urine dipstick does show some leukocytes.  Given symptoms will treat with Bactrim today.  Check lab urine culture and urinalysis.    Hypertension, essential  -     CBC W/ AUTO DIFFERENTIAL; Future  -     COMPREHENSIVE METABOLIC PANEL; Future  -     Lipid Panel; Future  -     TSH; Future    Strongly recommended patient to get labs today to evaluate for secondary causes of hypertension and organ damage from elevated blood pressures.  Patient declines today.  She declines new medication for this.  She states that she will consider following up at a later date for her blood pressure.  I did discuss with her the dangers and risks of untreated hypertension but patient states she will follow-up in the future as needed for this.  She states she will check this at home.  Provided patient information regarding how to check her blood pressure the proper way at home.        This office note has been dictated.  This dictation has been generated using M-Modal Fluency Direct dictation; some phonetic errors may occur.

## 2023-11-01 LAB — BACTERIA UR CULT: ABNORMAL

## 2023-11-20 ENCOUNTER — OFFICE VISIT (OUTPATIENT)
Dept: FAMILY MEDICINE | Facility: CLINIC | Age: 73
End: 2023-11-20
Payer: MEDICARE

## 2023-11-20 VITALS
TEMPERATURE: 98 F | RESPIRATION RATE: 18 BRPM | OXYGEN SATURATION: 98 % | HEIGHT: 61 IN | BODY MASS INDEX: 40.17 KG/M2 | HEART RATE: 80 BPM | WEIGHT: 212.75 LBS | DIASTOLIC BLOOD PRESSURE: 82 MMHG | SYSTOLIC BLOOD PRESSURE: 150 MMHG

## 2023-11-20 DIAGNOSIS — I10 HYPERTENSION, ESSENTIAL: ICD-10-CM

## 2023-11-20 DIAGNOSIS — N30.00 ACUTE CYSTITIS WITHOUT HEMATURIA: Primary | ICD-10-CM

## 2023-11-20 LAB
BILIRUB SERPL-MCNC: ABNORMAL MG/DL
BLOOD URINE, POC: ABNORMAL
CLARITY, POC UA: ABNORMAL
COLOR, POC UA: YELLOW
GLUCOSE UR QL STRIP: NORMAL
KETONES UR QL STRIP: ABNORMAL
LEUKOCYTE ESTERASE URINE, POC: ABNORMAL
NITRITE, POC UA: ABNORMAL
PH, POC UA: 6
PROTEIN, POC: ABNORMAL
SPECIFIC GRAVITY, POC UA: 1.01
UROBILINOGEN, POC UA: NORMAL

## 2023-11-20 PROCEDURE — 87086 URINE CULTURE/COLONY COUNT: CPT | Performed by: PHYSICIAN ASSISTANT

## 2023-11-20 PROCEDURE — 81002 URINALYSIS NONAUTO W/O SCOPE: CPT | Mod: PBBFAC,PO | Performed by: PHYSICIAN ASSISTANT

## 2023-11-20 PROCEDURE — 99999PBSHW POCT URINE DIPSTICK WITHOUT MICROSCOPE: ICD-10-PCS | Mod: PBBFAC,,,

## 2023-11-20 PROCEDURE — 99999 PR PBB SHADOW E&M-EST. PATIENT-LVL III: CPT | Mod: PBBFAC,,, | Performed by: PHYSICIAN ASSISTANT

## 2023-11-20 PROCEDURE — 99213 OFFICE O/P EST LOW 20 MIN: CPT | Mod: PBBFAC,PO | Performed by: PHYSICIAN ASSISTANT

## 2023-11-20 PROCEDURE — 87088 URINE BACTERIA CULTURE: CPT | Performed by: PHYSICIAN ASSISTANT

## 2023-11-20 PROCEDURE — 87077 CULTURE AEROBIC IDENTIFY: CPT | Performed by: PHYSICIAN ASSISTANT

## 2023-11-20 PROCEDURE — 99999PBSHW POCT URINE DIPSTICK WITHOUT MICROSCOPE: Mod: PBBFAC,,,

## 2023-11-20 PROCEDURE — 87186 SC STD MICRODIL/AGAR DIL: CPT | Performed by: PHYSICIAN ASSISTANT

## 2023-11-20 PROCEDURE — 99214 OFFICE O/P EST MOD 30 MIN: CPT | Mod: S$PBB,,, | Performed by: PHYSICIAN ASSISTANT

## 2023-11-20 PROCEDURE — 99214 PR OFFICE/OUTPT VISIT, EST, LEVL IV, 30-39 MIN: ICD-10-PCS | Mod: S$PBB,,, | Performed by: PHYSICIAN ASSISTANT

## 2023-11-20 PROCEDURE — 99999 PR PBB SHADOW E&M-EST. PATIENT-LVL III: ICD-10-PCS | Mod: PBBFAC,,, | Performed by: PHYSICIAN ASSISTANT

## 2023-11-20 RX ORDER — NITROFURANTOIN 25; 75 MG/1; MG/1
100 CAPSULE ORAL 2 TIMES DAILY
Qty: 14 CAPSULE | Refills: 0 | Status: SHIPPED | OUTPATIENT
Start: 2023-11-20 | End: 2023-11-27

## 2023-11-20 RX ORDER — MULTIVIT WITH MINERALS/HERBS
1 TABLET ORAL DAILY
COMMUNITY

## 2023-11-20 RX ORDER — AMOXICILLIN 500 MG
CAPSULE ORAL DAILY
COMMUNITY

## 2023-11-20 NOTE — PROGRESS NOTES
Subjective     Patient ID: Guillermina Martinez is a 73 y.o. female.    Chief Complaint: Urinary Tract Infection (Recurring. Burning and pressure today)    HPI:  73 y.o. female presents to clinic for urinary discomfort. She was diagnosed with UTI and started on Bactrim on 10/30. Patient initially had burning with urination, pelvic/lower back discomfort, and urinary urgency. She continues to feel some discomfort. She completed the course of bactrim and also tried AZO, probiotic yogurt, and high pH water at home. Patient states that she has had more frequent UTI's in the last few years and is usually given Bactrim, which doesn't fully clear infection, then Macrobid, which does resolve symptoms. Previous urine cultures have been positive for E. Coli. She admits to not wiping properly when having BM. Patient reports staying away from certain foods in order to prevent UTI's. She also has been dealing with some irritation since switching toilet paper. Patient stays overall active, playing [employer] 3x/week and drinks plenty of water. Patient denies hematuria, fever, confusion, and N/V      Review of Systems   Constitutional:  Negative for chills and fever.   HENT:  Negative for ear pain, rhinorrhea and sore throat.    Eyes:  Negative for pain, discharge and itching.   Respiratory:  Negative for cough and shortness of breath.    Cardiovascular:  Negative for chest pain.   Gastrointestinal:  Negative for constipation, diarrhea, nausea and vomiting.   Genitourinary:  Positive for dysuria. Negative for bladder incontinence, difficulty urinating, flank pain, hematuria, urgency, vaginal bleeding and vaginal discharge.   Musculoskeletal:  Negative for arthralgias and myalgias.   Integumentary:  Negative for color change, rash and mole/lesion.   Allergic/Immunologic: Positive for environmental allergies.   Neurological:  Negative for seizures, light-headedness, numbness and headaches.   Psychiatric/Behavioral:  Negative for agitation,  behavioral problems and confusion.         Objective     Physical Exam  Constitutional:       Appearance: Normal appearance.   HENT:      Head: Normocephalic and atraumatic.   Cardiovascular:      Rate and Rhythm: Normal rate and regular rhythm.   Pulmonary:      Effort: Pulmonary effort is normal.      Breath sounds: Normal breath sounds.   Abdominal:      Tenderness: There is no right CVA tenderness or left CVA tenderness.   Musculoskeletal:      Cervical back: Normal range of motion and neck supple.   Skin:     General: Skin is warm and dry.   Neurological:      Mental Status: She is alert.   Psychiatric:         Mood and Affect: Mood normal.         Behavior: Behavior normal.         Thought Content: Thought content normal.         Judgment: Judgment normal.          Assessment and Plan     1. Acute cystitis without hematuria  -     CULTURE, URINE  -     nitrofurantoin, macrocrystal-monohydrate, (MACROBID) 100 MG capsule; Take 1 capsule (100 mg total) by mouth 2 (two) times daily. for 7 days  Dispense: 14 capsule; Refill: 0  -     POCT URINE DIPSTICK WITHOUT MICROSCOPE  -     push fluids, take probiotics, if reoccurs refer to urology    2. Hypertension, essential  Blood pressure elevated for some time. Pt reluctant to start medication. States she will f/u with her pcp      Pt refuses all vaccines and screening tests     I hereby acknowledge that I am relying upon documentation authored by a PA student working under my supervision and further I hereby attest that I have verified the student documentation or findings by personally re-performing the physical exam and medical decision making activities of the Evaluation and Management service to be billed.  Christine Hernandez PAc    No follow-ups on file.

## 2023-11-22 LAB — BACTERIA UR CULT: ABNORMAL

## 2024-06-19 ENCOUNTER — PATIENT OUTREACH (OUTPATIENT)
Dept: ADMINISTRATIVE | Facility: HOSPITAL | Age: 74
End: 2024-06-19
Payer: MEDICARE

## 2024-06-19 NOTE — PROGRESS NOTES
Health Maintenance Due   Topic Date Due    TETANUS VACCINE  Never done    Mammogram  Never done    DEXA Scan  Never done    Colorectal Cancer Screening  Never done    Shingles Vaccine (1 of 2) Never done    RSV Vaccine (Age 60+ and Pregnant patients) (1 - 1-dose 60+ series) Never done    Pneumococcal Vaccines (Age 65+) (1 of 1 - PCV) Never done    COVID-19 Vaccine (1 - 2023-24 season) Never done    Mailed [place] health reminder.

## 2024-06-19 NOTE — LETTER
June 19, 2024    Guillermina CHUNG May  [address]  [facility] [id number]             Dear Guillermina    In addition to helping you feel better when you are sick, we are interested in preventing illness and injury in the first place.  Screening tests and routine follow up tests when you have certain medical problems are very essential in helping you stay as healthy as possible. In the spirit of maintaining your health, our system indicates that you are due for the following:    Health Maintenance Due   Topic Date Due    TETANUS VACCINE  Never done    Mammogram  Never done    DEXA Scan  Never done    Colorectal Cancer Screening  Never done    Shingles Vaccine (1 of 2) Never done    RSV Vaccine (Age 60+ and Pregnant patients) (1 - 1-dose 60+ series) Never done    Pneumococcal Vaccines (Age 65+) (1 of 1 - PCV) Never done    COVID-19 Vaccine (1 - 2023-24 season) Never done        Please be prepared to discuss these recommended tests at your next visit.  If you haven't had a visit within the past one year please [phone] to schedule or request an appointment.    Sincerely,       Your Health Care Team

## 2024-07-05 ENCOUNTER — TELEPHONE (OUTPATIENT)
Dept: FAMILY MEDICINE | Facility: CLINIC | Age: 74
End: 2024-07-05
Payer: MEDICARE

## 2024-07-05 ENCOUNTER — LAB VISIT (OUTPATIENT)
Dept: LAB | Facility: HOSPITAL | Age: 74
End: 2024-07-05
Attending: INTERNAL MEDICINE
Payer: MEDICARE

## 2024-07-05 ENCOUNTER — OFFICE VISIT (OUTPATIENT)
Dept: FAMILY MEDICINE | Facility: CLINIC | Age: 74
End: 2024-07-05
Payer: MEDICARE

## 2024-07-05 VITALS
WEIGHT: 215.81 LBS | DIASTOLIC BLOOD PRESSURE: 98 MMHG | SYSTOLIC BLOOD PRESSURE: 170 MMHG | HEIGHT: 61 IN | OXYGEN SATURATION: 96 % | RESPIRATION RATE: 16 BRPM | BODY MASS INDEX: 40.75 KG/M2 | TEMPERATURE: 98 F | HEART RATE: 98 BPM

## 2024-07-05 DIAGNOSIS — I10 HYPERTENSION, ESSENTIAL: ICD-10-CM

## 2024-07-05 DIAGNOSIS — Z13.220 ENCOUNTER FOR LIPID SCREENING FOR CARDIOVASCULAR DISEASE: ICD-10-CM

## 2024-07-05 DIAGNOSIS — R30.0 DYSURIA: Primary | ICD-10-CM

## 2024-07-05 DIAGNOSIS — R73.9 ELEVATED BLOOD SUGAR: ICD-10-CM

## 2024-07-05 DIAGNOSIS — E66.01 CLASS 3 OBESITY: ICD-10-CM

## 2024-07-05 DIAGNOSIS — Z13.6 ENCOUNTER FOR LIPID SCREENING FOR CARDIOVASCULAR DISEASE: ICD-10-CM

## 2024-07-05 DIAGNOSIS — N30.00 ACUTE CYSTITIS WITHOUT HEMATURIA: ICD-10-CM

## 2024-07-05 PROBLEM — E66.813 CLASS 3 OBESITY: Status: ACTIVE | Noted: 2024-07-05

## 2024-07-05 LAB
ALBUMIN SERPL BCP-MCNC: 4 G/DL (ref 3.5–5.2)
ALP SERPL-CCNC: 100 U/L (ref 55–135)
ALT SERPL W/O P-5'-P-CCNC: 20 U/L (ref 10–44)
ANION GAP SERPL CALC-SCNC: 11 MMOL/L (ref 8–16)
AST SERPL-CCNC: 20 U/L (ref 10–40)
BACTERIA #/AREA URNS AUTO: ABNORMAL /HPF
BASOPHILS # BLD AUTO: 0.08 K/UL (ref 0–0.2)
BASOPHILS NFR BLD: 0.7 % (ref 0–1.9)
BILIRUB SERPL-MCNC: 0.5 MG/DL (ref 0.1–1)
BILIRUB SERPL-MCNC: NEGATIVE MG/DL
BILIRUB UR QL STRIP: NEGATIVE
BLOOD URINE, POC: NORMAL
BUN SERPL-MCNC: 13 MG/DL (ref 8–23)
CALCIUM SERPL-MCNC: 10.2 MG/DL (ref 8.7–10.5)
CHLORIDE SERPL-SCNC: 102 MMOL/L (ref 95–110)
CHOLEST SERPL-MCNC: 270 MG/DL (ref 120–199)
CHOLEST/HDLC SERPL: 8.2 {RATIO} (ref 2–5)
CLARITY UR REFRACT.AUTO: CLEAR
CLARITY, POC UA: NORMAL
CO2 SERPL-SCNC: 25 MMOL/L (ref 23–29)
COLOR UR AUTO: YELLOW
COLOR, POC UA: YELLOW
CREAT SERPL-MCNC: 0.8 MG/DL (ref 0.5–1.4)
DIFFERENTIAL METHOD BLD: ABNORMAL
EOSINOPHIL # BLD AUTO: 0.2 K/UL (ref 0–0.5)
EOSINOPHIL NFR BLD: 1.5 % (ref 0–8)
ERYTHROCYTE [DISTWIDTH] IN BLOOD BY AUTOMATED COUNT: 12.6 % (ref 11.5–14.5)
EST. GFR  (NO RACE VARIABLE): >60 ML/MIN/1.73 M^2
ESTIMATED AVG GLUCOSE: 143 MG/DL (ref 68–131)
GLUCOSE SERPL-MCNC: 125 MG/DL (ref 70–110)
GLUCOSE UR QL STRIP: NEGATIVE
GLUCOSE UR QL STRIP: NORMAL
HBA1C MFR BLD: 6.6 % (ref 4–5.6)
HCT VFR BLD AUTO: 42.9 % (ref 37–48.5)
HDLC SERPL-MCNC: 33 MG/DL (ref 40–75)
HDLC SERPL: 12.2 % (ref 20–50)
HGB BLD-MCNC: 14.5 G/DL (ref 12–16)
HGB UR QL STRIP: NEGATIVE
IMM GRANULOCYTES # BLD AUTO: 0.04 K/UL (ref 0–0.04)
IMM GRANULOCYTES NFR BLD AUTO: 0.3 % (ref 0–0.5)
KETONES UR QL STRIP: NEGATIVE
KETONES UR QL STRIP: NORMAL
LDLC SERPL CALC-MCNC: ABNORMAL MG/DL (ref 63–159)
LEUKOCYTE ESTERASE UR QL STRIP: ABNORMAL
LEUKOCYTE ESTERASE URINE, POC: NORMAL
LYMPHOCYTES # BLD AUTO: 2.8 K/UL (ref 1–4.8)
LYMPHOCYTES NFR BLD: 22.8 % (ref 18–48)
MCH RBC QN AUTO: 32.7 PG (ref 27–31)
MCHC RBC AUTO-ENTMCNC: 33.8 G/DL (ref 32–36)
MCV RBC AUTO: 97 FL (ref 82–98)
MICROSCOPIC COMMENT: ABNORMAL
MONOCYTES # BLD AUTO: 0.9 K/UL (ref 0.3–1)
MONOCYTES NFR BLD: 7.6 % (ref 4–15)
NEUTROPHILS # BLD AUTO: 8.2 K/UL (ref 1.8–7.7)
NEUTROPHILS NFR BLD: 67.1 % (ref 38–73)
NITRITE UR QL STRIP: NEGATIVE
NITRITE, POC UA: NORMAL
NONHDLC SERPL-MCNC: 237 MG/DL
NRBC BLD-RTO: 0 /100 WBC
PH UR STRIP: 6 [PH] (ref 5–8)
PH, POC UA: 6
PLATELET # BLD AUTO: 217 K/UL (ref 150–450)
PMV BLD AUTO: 10.6 FL (ref 9.2–12.9)
POTASSIUM SERPL-SCNC: 4.1 MMOL/L (ref 3.5–5.1)
PROT SERPL-MCNC: 7.2 G/DL (ref 6–8.4)
PROT UR QL STRIP: NEGATIVE
PROTEIN, POC: NORMAL
RBC # BLD AUTO: 4.43 M/UL (ref 4–5.4)
RBC #/AREA URNS AUTO: 1 /HPF (ref 0–4)
SODIUM SERPL-SCNC: 138 MMOL/L (ref 136–145)
SP GR UR STRIP: 1.02 (ref 1–1.03)
SPECIFIC GRAVITY, POC UA: 1020
TRIGL SERPL-MCNC: 684 MG/DL (ref 30–150)
TSH SERPL DL<=0.005 MIU/L-ACNC: 2.2 UIU/ML (ref 0.4–4)
URN SPEC COLLECT METH UR: ABNORMAL
UROBILINOGEN, POC UA: NORMAL
WBC # BLD AUTO: 12.26 K/UL (ref 3.9–12.7)
WBC #/AREA URNS AUTO: 22 /HPF (ref 0–5)
WBC CLUMPS UR QL AUTO: ABNORMAL

## 2024-07-05 PROCEDURE — 83036 HEMOGLOBIN GLYCOSYLATED A1C: CPT | Performed by: INTERNAL MEDICINE

## 2024-07-05 PROCEDURE — 85025 COMPLETE CBC W/AUTO DIFF WBC: CPT | Performed by: INTERNAL MEDICINE

## 2024-07-05 PROCEDURE — 81001 URINALYSIS AUTO W/SCOPE: CPT | Performed by: INTERNAL MEDICINE

## 2024-07-05 PROCEDURE — 99214 OFFICE O/P EST MOD 30 MIN: CPT | Mod: PBBFAC,PO | Performed by: INTERNAL MEDICINE

## 2024-07-05 PROCEDURE — 99999 PR PBB SHADOW E&M-EST. PATIENT-LVL IV: CPT | Mod: PBBFAC,,, | Performed by: INTERNAL MEDICINE

## 2024-07-05 PROCEDURE — 80053 COMPREHEN METABOLIC PANEL: CPT | Performed by: INTERNAL MEDICINE

## 2024-07-05 PROCEDURE — 36415 COLL VENOUS BLD VENIPUNCTURE: CPT | Mod: PO | Performed by: INTERNAL MEDICINE

## 2024-07-05 PROCEDURE — 80061 LIPID PANEL: CPT | Performed by: INTERNAL MEDICINE

## 2024-07-05 PROCEDURE — 84443 ASSAY THYROID STIM HORMONE: CPT | Performed by: INTERNAL MEDICINE

## 2024-07-05 RX ORDER — NITROFURANTOIN 25; 75 MG/1; MG/1
100 CAPSULE ORAL 2 TIMES DAILY
Qty: 14 CAPSULE | Refills: 0 | Status: SHIPPED | OUTPATIENT
Start: 2024-07-05

## 2024-07-05 NOTE — PROGRESS NOTES
Subjective:       Patient ID: Guillermina Martinez is a pleasant 74 y.o. White female patient    Chief Complaint: Urinary Tract Infection      Patient is a new pt to me but was seen last in November 2023 by MARY Brunner due to UTI.      HPI:     Shunt with past medical history as per list of problems below coming today due to symptoms of UTI.  She reports feeling some discomfort at urination for several days, has been take probiotics among others, however the last 2 or 3 days, reports having to want to go to the bathroom, with some dysuria.  He has had the same symptoms she has usually when she is a UTI.  She denies blood in the urine, no flank pain, no chills, no issue with bowel movements except if the eating baked beans.  No N/V.   She states the trigger of the UTI may be eating asparagus or spaghetti sauce among others?  She denies any allergy except to penicillin, however was able to tolerate amoxicillin in the past?  States she was very active  before COVID, she was playing pickle ball, went to the gym, did [employer], now can not do so much, she would like to be able to resume activity, she likes to swim.  She does not have a regular doctor for now, was seen by Dr. Owens some time ago, then by PA.  For now declines to establish care with one of my colleagues.    Patient Active Problem List   Diagnosis    Hypertension, essential    Class 3 obesity         PAST MEDICAL HISTORY  History reviewed. No pertinent past medical history.     PAST SURGICAL HISTORY:  History reviewed. No pertinent surgical history.     SOCIAL HISTORY:   reports that she has never smoked. She has never used smokeless tobacco. She reports that she does not currently use alcohol. She reports that she does not use drugs.     FAMILY HISTORY:  No family history on file.     ALLERGIES:   Review of patient's allergies indicates:   Allergen Reactions    Penicillins        MEDICATIONS:    Current Outpatient Medications:     ascorbic acid, vitamin C, (VITAMIN  "C) 1000 MG tablet, Take 1,500 mg by mouth once daily., Disp: , Rfl:     b complex vitamins tablet, Take 1 tablet by mouth once daily., Disp: , Rfl:     ergocalciferol, vitamin D2, (VITAMIN D ORAL), Take 2,000 Int'l Units by mouth once daily., Disp: , Rfl:     loratadine (CLARITIN) 5 mg chewable tablet, Take 5 mg by mouth once daily., Disp: , Rfl:     multivitamin capsule, Take 1 capsule by mouth once daily., Disp: , Rfl:     omega-3 fatty acids/fish oil (FISH OIL-OMEGA-3 FATTY ACIDS) 300-1,000 mg capsule, Take by mouth once daily., Disp: , Rfl:     diclofenac sodium (VOLTAREN) 1 % Gel, Apply 2 g topically daily as needed (pain). (Patient not taking: Reported on 11/20/2023), Disp: 100 g, Rfl: 1    nitrofurantoin, macrocrystal-monohydrate, (MACROBID) 100 MG capsule, Take 1 capsule (100 mg total) by mouth 2 (two) times daily., Disp: 14 capsule, Rfl: 0    Review of Systems   Genitourinary:  Positive for dysuria and urgency.       Objective:      Physical Exam  Vitals reviewed.   Constitutional:       Appearance: Normal appearance.   Cardiovascular:      Rate and Rhythm: Normal rate and regular rhythm.      Pulses: Normal pulses.      Heart sounds: Normal heart sounds.   Pulmonary:      Effort: Pulmonary effort is normal.      Breath sounds: Normal breath sounds.   Abdominal:      General: Bowel sounds are normal.   Neurological:      Mental Status: She is alert.         Vitals:    07/05/24 1302   BP: (!) 170/98   BP Location: Left arm   Patient Position: Sitting   BP Method: Large (Manual)   Pulse: 98   Resp: 16   Temp: 98.3 °F (36.8 °C)   TempSrc: Oral   SpO2: 96%   Weight: 97.9 kg (215 lb 13.3 oz)   Height: 5' 1" (1.549 m)     Body mass index is 40.78 kg/m².    RESULTS: Reviewed labs from last 12 months    Last Lab Results:     Lab Results   Component Value Date    WBC 8.78 12/31/2019    HGB 15.1 12/31/2019    HCT 45.2 12/31/2019     12/31/2019     (L) 12/31/2019    K 3.9 12/31/2019     12/31/2019 "    CO2 26 12/31/2019    BUN 18 12/31/2019    CREATININE 0.7 12/31/2019    CALCIUM 9.3 12/31/2019    ALBUMIN 4.1 12/31/2019    AST 19 12/31/2019    ALT 14 12/31/2019    CHOL 274 (H) 12/31/2019    TRIG 524 (H) 12/31/2019    HDL 38 (L) 12/31/2019    LDLCALC Invalid, Trig>400.0 12/31/2019    TSH 1.748 12/31/2019         Assessment & Plan:       1. Dysuria  -     POCT URINE DIPSTICK WITHOUT MICROSCOPE  -     Urinalysis, Reflex to Urine Culture Urine, Clean Catch  -     Urine Culture High Risk  -     Urinalysis  -     Urine Culture High Risk    See HPI.  Most recent episode in November 23 with ESBL E coli.  Treated with nitrofurantoin x 7 days with good response. POC with some WBC. UA and culture. See below.  .  2. Acute cystitis without hematuria  -     nitrofurantoin, macrocrystal-monohydrate, (MACROBID) 100 MG capsule; Take 1 capsule (100 mg total) by mouth 2 (two) times daily.  Dispense: 14 capsule; Refill: 0    Will treat again with the nitro after standing for UA and culture.  Advised drinking plenty of fluid, go on with probiotics, will reassess.  Discussed symptoms and signs of concern to monitor.  Will do blood work to check for kidney function as not done for more than 3 years.      3. Class 3 obesity    Will need to work on this with PCP when establishing care.    4. Hypertension, essential  -     CBC Auto Differential; Future  -     Comprehensive Metabolic Panel; Future; Expected date: 07/05/2024  -     TSH; Future; Expected date: 07/05/2024    BP elevated, no treatment, advised patient to establish care with a PCP ASAP.  Blood work.    5. Elevated blood sugar  -     Hemoglobin A1C; Future; Expected date: 07/05/2024    6. Encounter for lipid screening for cardiovascular disease  -     Lipid Panel; Future; Expected date: 07/05/2024       No follow-ups on file.    This note was created by combination of typed  and M-Modal dictation.  Transcription errors may be present.  If there are any questions,  please contact me.

## 2024-07-05 NOTE — TELEPHONE ENCOUNTER
----- Message from Noy Hood MD sent at 7/5/2024 10:48 AM CDT -----  Regarding: UTI?  Please remind pt to come with an urine specimen or able to provide one in the Clinic. Multiple UTIs in the past, with E. Coli R to several antibiotics. Thanks

## 2024-07-05 NOTE — PROGRESS NOTES
Health Maintenance Due   Topic     TETANUS VACCINE      Mammogram  Pending     DEXA Scan  Consult PCP     Colorectal Cancer Screening  Consult PCP     Shingles Vaccine (1 of 2) HX of chickenpox. Notified pt can get vaccine at pharmacy    RSV Vaccine (Age 60+ and Pregnant patients) (1 - 1-dose 60+ series) Not offered at this office    Pneumococcal Vaccines (Age 65+) (1 of 1 - PCV)     COVID-19 Vaccine (1 - 2023-24 season) Not offered at this office

## 2024-07-05 NOTE — TELEPHONE ENCOUNTER
LM informing pt to please drink enough to be able to provide urine specimen when she comes for appt this afternoon

## 2024-07-05 NOTE — TELEPHONE ENCOUNTER
Pt informed to please come with full bladder so specimen can be obtained; she verbalized understanding

## 2024-07-05 NOTE — PROGRESS NOTES
Health Maintenance Due   Topic     TETANUS VACCINE      Mammogram      DEXA Scan      Colorectal Cancer Screening  CONSULT WITH PCP    Shingles Vaccine (1 of 2) hx chickenpox ; inform pt can get vaccine at pharmacy.    RSV Vaccine (Age 60+ and Pregnant patients) (1 - 1-dose 60+ series) Not given at this facility       Pneumococcal Vaccines (Age 65+) (1 of 1 - PCV)     COVID-19 Vaccine (1 - 2023-24 season) Not given at this facility

## 2024-07-05 NOTE — TELEPHONE ENCOUNTER
----- Message from Noy Hood MD sent at 7/5/2024  7:19 AM CDT -----  Regarding: UIT  Please remind pt to come with an urine specimen or able to provide one in the Clinic. Multiple UTIs in the past, with E. Coli R to several antibiotics. Thanks

## 2024-07-06 LAB — BACTERIA UR CULT: ABNORMAL

## 2024-08-03 ENCOUNTER — OFFICE VISIT (OUTPATIENT)
Dept: URGENT CARE | Facility: CLINIC | Age: 74
End: 2024-08-03
Payer: MEDICARE

## 2024-08-03 VITALS
DIASTOLIC BLOOD PRESSURE: 83 MMHG | BODY MASS INDEX: 40.59 KG/M2 | HEIGHT: 61 IN | WEIGHT: 215 LBS | OXYGEN SATURATION: 96 % | TEMPERATURE: 99 F | SYSTOLIC BLOOD PRESSURE: 175 MMHG | HEART RATE: 101 BPM

## 2024-08-03 DIAGNOSIS — H65.113 ACUTE ALLERGIC SEROUS OTITIS MEDIA OF BOTH EARS: ICD-10-CM

## 2024-08-03 DIAGNOSIS — R30.0 DYSURIA: ICD-10-CM

## 2024-08-03 DIAGNOSIS — N30.00 ACUTE CYSTITIS WITHOUT HEMATURIA: Primary | ICD-10-CM

## 2024-08-03 LAB
BILIRUBIN, UA POC OHS: NEGATIVE
BLOOD, UA POC OHS: ABNORMAL
CLARITY, UA POC OHS: CLEAR
COLOR, UA POC OHS: YELLOW
GLUCOSE, UA POC OHS: NEGATIVE
KETONES, UA POC OHS: NEGATIVE
LEUKOCYTES, UA POC OHS: ABNORMAL
NITRITE, UA POC OHS: NEGATIVE
PH, UA POC OHS: 5.5
PROTEIN, UA POC OHS: 30
SPECIFIC GRAVITY, UA POC OHS: 1.02
UROBILINOGEN, UA POC OHS: 0.2

## 2024-08-03 PROCEDURE — 87186 SC STD MICRODIL/AGAR DIL: CPT | Performed by: PHYSICIAN ASSISTANT

## 2024-08-03 PROCEDURE — 87086 URINE CULTURE/COLONY COUNT: CPT | Performed by: PHYSICIAN ASSISTANT

## 2024-08-03 PROCEDURE — 81003 URINALYSIS AUTO W/O SCOPE: CPT | Mod: QW,S$GLB,, | Performed by: PHYSICIAN ASSISTANT

## 2024-08-03 PROCEDURE — 99214 OFFICE O/P EST MOD 30 MIN: CPT | Mod: S$GLB,,, | Performed by: PHYSICIAN ASSISTANT

## 2024-08-03 PROCEDURE — 87088 URINE BACTERIA CULTURE: CPT | Performed by: PHYSICIAN ASSISTANT

## 2024-08-03 RX ORDER — NITROFURANTOIN 25; 75 MG/1; MG/1
100 CAPSULE ORAL 2 TIMES DAILY
Qty: 10 CAPSULE | Refills: 0 | Status: SHIPPED | OUTPATIENT
Start: 2024-08-03 | End: 2024-08-08

## 2024-08-03 RX ORDER — PHENAZOPYRIDINE HYDROCHLORIDE 200 MG/1
200 TABLET, FILM COATED ORAL 3 TIMES DAILY PRN
Qty: 6 TABLET | Refills: 0 | Status: SHIPPED | OUTPATIENT
Start: 2024-08-03 | End: 2024-08-05

## 2024-08-03 NOTE — LETTER
"  August 3, 2024      [facility]  [address], [address]  [place] [address]  Phone: [phone]  Fax: [phone]       Patient: Guillermina Martinez   YOB: 1950  Date of Visit: 08/03/2024    To Whom It May Concern:    Becky Martinez  was at [facility] on 08/03/2024. The patient may return to work/school on 8/3/24 with no restrictions. If you have any questions or concerns, or if I can be of further assistance, please do not hesitate to contact me.    Sincerely,        ZaydaABNER Lepe (Jackie)     "

## 2024-08-03 NOTE — PATIENT INSTRUCTIONS
Recommendations to help prevent UTIs  Cranberry products: 8-ounce (approximately 240 mL) glass of cranberry juice once or twice daily or cranberry concentrate tablets 500 mg to 1000 mg total daily dose  Postcoital voiding (urinate after sex)  Hygiene: Wiping from front to back to avoid perineal contamination with fecal edwin is routinely recommended as a prevention measure.   Topical estrogen for postmenopausal women   Drink plenty of fluids.        Urine culture was ordered if you have a history of recurrent UTIs, pediatrics/elderly population. You will get a phone call within 3-5 days regarding urine culture results.  If you were prescribed antibiotics, please take them to completion.  If you were prescribed a fluoroquinolone antibiotic such as levofloxacin or ciprofloxacin, avoid heavy lifting for 1-2 weeks after completion of the antibiotic as these antibiotics have a rare complication of tendon rupture. Avoidance of heavy lifting or strenuous exercise reduces this risk.  If you were prescribed a narcotic medication, do not drive or operate heavy equipment or machinery while taking these medications.  Please drink plenty of fluids.  Please get plenty of rest.  If you were prescribed Pyridium (phenazopyridine), please be aware that if you wear contact lens that this medication may stain your contacts.  While taking this medication it is recommended that you do not wear your contacts until 24 hours after your last dose. If it not covered, you can  purchase Azo (phenazopyridine 99 mg) over the counter at drug stores.  If you  smoke, please stop smoking.  If not allergic, take Tylenol (Acetaminophen) 650 mg to  1 g every 6 hours as needed for fever and/or Motrin (Ibuprofen) 600 to 800 mg every 6 hours as needed for fever as directed for control of pain and/or fever      Please remember that you have received care at an urgent care today. Urgent cares are not emergency rooms and are not equipped to handle life  threatening emergencies and cannot rule in or out certain medical conditions and you may be released before all of your medical problems are known or treated. Please arrange follow up with your primary care physician or speciality clinic  within 2-5 days if your signs and symptoms have not resolved or worsen. Patient can call our Referral Hotline at [phone] to make an appointment.    Please return here or go to the Emergency Department for any concerns or worsening of condition.Patient was educated on signs/symptoms that would warrant emergent medical attention.   Signs of infection. These include a fever of 100.4°F (38°C) or higher, chills, back pain, nausea, throwing up, or bloody urine.  Signs come back after treatment ends  You notice more blood in your urine.  Your signs get worse or do not improve within 24 hours of starting treatment.  You are not able to urinate for more than 8 hours.  Your signs come back after treatment has stopped.

## 2024-08-03 NOTE — PROGRESS NOTES
"Subjective:      Patient ID: Guillermina Martinez is a 74 y.o. female.    Vitals:  height is 5' 1" (1.549 m) and weight is 97.5 kg (215 lb). Her oral temperature is 98.5 °F (36.9 °C). Her blood pressure is 175/83 (abnormal) and her pulse is 101. Her oxygen saturation is 96%.     Chief Complaint: Dysuria    Guillermina Martinez is a 74 y.o. female who complains of  discomfort while urinating  which started 3 days ago. Pt tried Azo cranberry pills. She states she is working on wiping herself from front to back. She reports recurrent UTIs; last one was 1 month ago on 7/4/24. She c/o plugged ears; finished course of steroids recently. Reports hx of seasonal allergies; takes claritin PRN. She states she is sensitive to medicines. She is followed by a non-[facility].       Dysuria   This is a new problem. The current episode started in the past 7 days. The problem occurs every urination. The problem has been gradually worsening. The quality of the pain is described as burning. The pain is at a severity of 1/10. The pain is mild. There has been no fever. She is Not sexually active. There is No history of pyelonephritis. Associated symptoms include frequency and urgency. Pertinent negatives include no behavior changes, chills, discharge, flank pain, hematuria, hesitancy, nausea, possible pregnancy, sweats, vomiting, weight loss, bubble bath use, constipation, rash or withholding. She has tried nothing for the symptoms. The treatment provided mild relief. Her past medical history is significant for hypertension and recurrent UTIs. There is no history of catheterization, diabetes insipidus, diabetes mellitus, genitourinary reflux, kidney stones, a single kidney, STD, urinary stasis or a urological procedure.       Constitution: Negative for chills, fatigue, unexpected weight change, generalized weakness and international travel in last 60 days.   HENT:  Negative for ear pain, ear discharge, foreign body in ear, tinnitus and hearing " loss.    Cardiovascular:  Negative for chest pain, leg swelling, palpitations and sob on exertion.   Respiratory:  Negative for cough, sputum production, shortness of breath, wheezing and asthma.    Gastrointestinal:  Negative for abdominal pain, nausea, vomiting and constipation.   Genitourinary:  Positive for dysuria, frequency and urgency. Negative for urine decreased, flank pain, hematuria, history of kidney stones and pelvic pain.   Skin:  Negative for rash.   Allergic/Immunologic: Positive for environmental allergies and seasonal allergies. Negative for asthma.   Psychiatric/Behavioral:  Negative for nervous/anxious. The patient is not nervous/anxious.       Objective:     Physical Exam   Constitutional: She is oriented to person, place, and time. She is cooperative. No distress.      Comments:Patient is awake and alert, sitting up in exam chair, speaking and answering in complete sentences   normal  HENT:   Head: Normocephalic and atraumatic.   Ears:   Right Ear: External ear and ear canal normal. Tympanic membrane is retracted. A middle ear effusion is present.   Left Ear: External ear and ear canal normal. Tympanic membrane is retracted. A middle ear effusion is present.   Nose: No rhinorrhea or congestion.   Mouth/Throat: Mucous membranes are moist. No oropharyngeal exudate or posterior oropharyngeal erythema. Oropharynx is clear.   Eyes: Conjunctivae are normal. Pupils are equal, round, and reactive to light. Extraocular movement intact   Neck: Neck supple.   Cardiovascular: Normal rate, regular rhythm, normal heart sounds and normal pulses.   Pulmonary/Chest: Effort normal and breath sounds normal. No respiratory distress. She has no wheezes. She has no rhonchi. She has no rales.   Abdominal: Normal appearance.   Musculoskeletal: Normal range of motion.         General: Normal range of motion.      Cervical back: She exhibits no tenderness.   Lymphadenopathy:     She has no cervical adenopathy.    Neurological: She is alert and oriented to person, place, and time.   Skin: Skin is warm. Capillary refill takes less than 2 seconds.   Psychiatric: Her behavior is normal. Mood, judgment and thought content normal.   Nursing note and vitals reviewed.      Assessment:     1. Acute cystitis without hematuria    2. Dysuria    3. Acute allergic serous otitis media of both ears      Patient presents with clinical exam findings and history consistent with above.      On exam, patient is nontoxic appearing and vitals are stable.      Diagnostic testing results were reviewed and discussed with patient/guardian.   Tests ordered in clinic:  Results for orders placed or performed in visit on 08/03/24   POCT Urinalysis(Instrument)   Result Value Ref Range    Color, POC UA Yellow Yellow, Straw, Colorless    Clarity, POC UA Clear Clear    Glucose, POC UA Negative Negative    Bilirubin, POC UA Negative Negative    Ketones, POC UA Negative Negative    Spec Grav POC UA 1.025 1.005 - 1.030    Blood, POC UA Trace-intact (A) Negative    pH, POC UA 5.5 5.0 - 8.0    Protein, POC UA 30 (A) Negative    Urobilinogen, POC UA 0.2 <=1.0    Nitrite, POC UA Negative Negative    WBC, POC UA Small (A) Negative       Previous progress notes/admissions/labs and medications were reviewed.  Reviewed GFR > 60 from CMP on 7/5/24.   Reviewed urine culture from 7/5/24 - positive for E.coli ESBL; sensitive to nitrofurantoin, ertapenem, gentamicin, meropenem, and tobramycin.  Urine cultures from 7/5/24, 11/20/23, and 10/30/23 with E.coli ESBL with similar sensitivity.  Urine culture from 10/30/23 is sensitive to Bactrim.       Plan:   Recommended claritn, flonase, +/- pseudoephedrine <5 days if blood pressure is 130/80mmhg.     Acute cystitis without hematuria  -     CULTURE, URINE  -     phenazopyridine (PYRIDIUM) 200 MG tablet; Take 1 tablet (200 mg total) by mouth 3 (three) times daily as needed for Pain.  Dispense: 6 tablet; Refill: 0  -      "nitrofurantoin, macrocrystal-monohydrate, (MACROBID) 100 MG capsule; Take 1 capsule (100 mg total) by mouth 2 (two) times daily. for 5 days  Dispense: 10 capsule; Refill: 0  -     Ambulatory referral/consult to Urogynecology    Dysuria  -     POCT Urinalysis(Instrument)  -     CULTURE, URINE  -     phenazopyridine (PYRIDIUM) 200 MG tablet; Take 1 tablet (200 mg total) by mouth 3 (three) times daily as needed for Pain.  Dispense: 6 tablet; Refill: 0  -     nitrofurantoin, macrocrystal-monohydrate, (MACROBID) 100 MG capsule; Take 1 capsule (100 mg total) by mouth 2 (two) times daily. for 5 days  Dispense: 10 capsule; Refill: 0  -     Ambulatory referral/consult to Urogynecology    Acute allergic serous otitis media of both ears  -     Ambulatory referral/consult to ENT                    1) See orders for this visit as documented in the electronic medical record.  2) Symptomatic therapy suggested: use acetaminophen/ibuprofen every 6-8 hours prn pain or fever, push fluids.   3) Call or return to clinic prn if these symptoms worsen or fail to improve as anticipated.    Discussed results/diagnosis/plan with patient in clinic.  We had shared decision making for patient's treatment. Patient verbalized understanding and in agreement with current treatment plan.     Patient was instructed to return for re-evaluation with urgent care or PCP for continued outpatient workup and management if symptoms do not improve/worsening symptoms. Strict ED versus clinic precautions given in depth.    Discharge and follow-up instructions given verbally/printed with the patient who expressed understanding. The instructions and results are also available on [email or web address].              Zayda "Carla" ABNER Kirk          Patient Instructions   Recommendations to help prevent UTIs  Cranberry products: 8-ounce (approximately 240 mL) glass of cranberry juice once or twice daily or cranberry concentrate tablets 500 mg to 1000 mg total daily " dose  Postcoital voiding (urinate after sex)  Hygiene: Wiping from front to back to avoid perineal contamination with fecal edwin is routinely recommended as a prevention measure.   Topical estrogen for postmenopausal women   Drink plenty of fluids.        Urine culture was ordered if you have a history of recurrent UTIs, pediatrics/elderly population. You will get a phone call within 3-5 days regarding urine culture results.  If you were prescribed antibiotics, please take them to completion.  If you were prescribed a fluoroquinolone antibiotic such as levofloxacin or ciprofloxacin, avoid heavy lifting for 1-2 weeks after completion of the antibiotic as these antibiotics have a rare complication of tendon rupture. Avoidance of heavy lifting or strenuous exercise reduces this risk.  If you were prescribed a narcotic medication, do not drive or operate heavy equipment or machinery while taking these medications.  Please drink plenty of fluids.  Please get plenty of rest.  If you were prescribed Pyridium (phenazopyridine), please be aware that if you wear contact lens that this medication may stain your contacts.  While taking this medication it is recommended that you do not wear your contacts until 24 hours after your last dose. If it not covered, you can  purchase Azo (phenazopyridine 99 mg) over the counter at drug stores.  If you  smoke, please stop smoking.  If not allergic, take Tylenol (Acetaminophen) 650 mg to  1 g every 6 hours as needed for fever and/or Motrin (Ibuprofen) 600 to 800 mg every 6 hours as needed for fever as directed for control of pain and/or fever      Please remember that you have received care at an urgent care today. Urgent cares are not emergency rooms and are not equipped to handle life threatening emergencies and cannot rule in or out certain medical conditions and you may be released before all of your medical problems are known or treated. Please arrange follow up with your primary  care physician or speciality clinic  within 2-5 days if your signs and symptoms have not resolved or worsen. Patient can call our Referral Hotline at [phone] to make an appointment.    Please return here or go to the Emergency Department for any concerns or worsening of condition.Patient was educated on signs/symptoms that would warrant emergent medical attention.   Signs of infection. These include a fever of 100.4°F (38°C) or higher, chills, back pain, nausea, throwing up, or bloody urine.  Signs come back after treatment ends  You notice more blood in your urine.  Your signs get worse or do not improve within 24 hours of starting treatment.  You are not able to urinate for more than 8 hours.  Your signs come back after treatment has stopped.

## 2024-08-07 LAB — BACTERIA UR CULT: ABNORMAL

## 2024-11-05 ENCOUNTER — OFFICE VISIT (OUTPATIENT)
Dept: URGENT CARE | Facility: CLINIC | Age: 74
End: 2024-11-05
Payer: MEDICARE

## 2024-11-05 VITALS
BODY MASS INDEX: 39.63 KG/M2 | SYSTOLIC BLOOD PRESSURE: 152 MMHG | HEART RATE: 81 BPM | TEMPERATURE: 98 F | OXYGEN SATURATION: 96 % | WEIGHT: 209.88 LBS | DIASTOLIC BLOOD PRESSURE: 85 MMHG | HEIGHT: 61 IN | RESPIRATION RATE: 18 BRPM

## 2024-11-05 DIAGNOSIS — R03.0 ELEVATED BLOOD PRESSURE READING: ICD-10-CM

## 2024-11-05 DIAGNOSIS — R30.0 DYSURIA: ICD-10-CM

## 2024-11-05 DIAGNOSIS — N30.00 ACUTE CYSTITIS WITHOUT HEMATURIA: Primary | ICD-10-CM

## 2024-11-05 LAB
BILIRUBIN, UA POC OHS: NEGATIVE
BLOOD, UA POC OHS: NEGATIVE
CLARITY, UA POC OHS: CLEAR
COLOR, UA POC OHS: YELLOW
GLUCOSE, UA POC OHS: NEGATIVE
KETONES, UA POC OHS: NEGATIVE
LEUKOCYTES, UA POC OHS: ABNORMAL
NITRITE, UA POC OHS: NEGATIVE
PH, UA POC OHS: 7
PROTEIN, UA POC OHS: NEGATIVE
SPECIFIC GRAVITY, UA POC OHS: 1.01
UROBILINOGEN, UA POC OHS: 0.2

## 2024-11-05 PROCEDURE — 87088 URINE BACTERIA CULTURE: CPT

## 2024-11-05 PROCEDURE — 81003 URINALYSIS AUTO W/O SCOPE: CPT | Mod: QW,S$GLB,,

## 2024-11-05 PROCEDURE — 87086 URINE CULTURE/COLONY COUNT: CPT

## 2024-11-05 PROCEDURE — 87186 SC STD MICRODIL/AGAR DIL: CPT

## 2024-11-05 PROCEDURE — 87184 SC STD DISK METHOD PER PLATE: CPT

## 2024-11-05 PROCEDURE — 99214 OFFICE O/P EST MOD 30 MIN: CPT | Mod: S$GLB,,,

## 2024-11-05 RX ORDER — NITROFURANTOIN 25; 75 MG/1; MG/1
100 CAPSULE ORAL 2 TIMES DAILY
Qty: 14 CAPSULE | Refills: 0 | Status: SHIPPED | OUTPATIENT
Start: 2024-11-05 | End: 2024-11-12

## 2024-11-05 NOTE — PROGRESS NOTES
"Subjective:      Patient ID: Guillermina Martinez is a 74 y.o. female.    Vitals:  height is 5' 1" (1.549 m) and weight is 95.2 kg (209 lb 14.1 oz). Her oral temperature is 98.3 °F (36.8 °C). Her blood pressure is 152/85 (abnormal) and her pulse is 81. Her respiration is 18 and oxygen saturation is 96%.     Chief Complaint: Dysuria    Pt here for dysuria on and off, urinary frequency and urgency onset 2 days ago. Pt sts she thinks it is early and trying to catch it early. Pt has taken cranberry pills for relief. History of frequent UTIs.    Dysuria   This is a new problem. The current episode started in the past 7 days. The problem has been gradually worsening. The quality of the pain is described as aching and burning. The pain is at a severity of 2/10. The pain is mild. There has been no fever. Associated symptoms include frequency and urgency. Pertinent negatives include no behavior changes, chills, discharge, flank pain, hematuria, hesitancy, nausea, possible pregnancy, sweats, vomiting, weight loss, bubble bath use, constipation, rash or withholding. She has tried home medications for the symptoms. The treatment provided no relief. Her past medical history is significant for recurrent UTIs. There is no history of catheterization, diabetes insipidus, diabetes mellitus, genitourinary reflux, hypertension, kidney stones, a single kidney, STD, urinary stasis or a urological procedure.       Constitution: Negative for chills and fever.   Neck: Negative for neck pain.   Cardiovascular:  Negative for chest pain.   Respiratory:  Negative for shortness of breath.    Gastrointestinal:  Negative for abdominal pain, nausea, vomiting and constipation.   Genitourinary:  Positive for dysuria, frequency and urgency. Negative for flank pain and hematuria.   Musculoskeletal:  Negative for muscle cramps and muscle ache.   Skin:  Negative for pale and rash.   Neurological:  Negative for dizziness, light-headedness, passing out, " disorientation and altered mental status.   Psychiatric/Behavioral:  Negative for altered mental status, disorientation and confusion.       Objective:     Physical Exam   Constitutional: She is oriented to person, place, and time.  Non-toxic appearance. She does not appear ill. No distress.   HENT:   Head: Normocephalic and atraumatic.   Eyes: Conjunctivae are normal. Extraocular movement intact   Neck: Neck supple.   Cardiovascular: Normal rate, regular rhythm, normal heart sounds and normal pulses.   Pulmonary/Chest: Effort normal and breath sounds normal.   Abdominal: Normal appearance. There is no abdominal tenderness. There is no left CVA tenderness and no right CVA tenderness.   Musculoskeletal: Normal range of motion.         General: Normal range of motion.   Neurological: She is alert, oriented to person, place, and time and at baseline.   Skin: Skin is warm and dry.   Psychiatric: Her behavior is normal. Mood normal.   Nursing note and vitals reviewed.    Assessment:     Results for orders placed or performed in visit on 11/05/24   POCT Urinalysis(Instrument)    Collection Time: 11/05/24  8:09 AM   Result Value Ref Range    Color, POC UA Yellow Yellow, Straw, Colorless    Clarity, POC UA Clear Clear    Glucose, POC UA Negative Negative    Bilirubin, POC UA Negative Negative    Ketones, POC UA Negative Negative    Spec Grav POC UA 1.015 1.005 - 1.030    Blood, POC UA Negative Negative    pH, POC UA 7.0 5.0 - 8.0    Protein, POC UA Negative Negative    Urobilinogen, POC UA 0.2 <=1.0    Nitrite, POC UA Negative Negative    WBC, POC UA Moderate (A) Negative       1. Acute cystitis without hematuria    2. Dysuria    3. Elevated blood pressure reading        Plan:     Acute cystitis without hematuria  -     nitrofurantoin, macrocrystal-monohydrate, (MACROBID) 100 MG capsule; Take 1 capsule (100 mg total) by mouth 2 (two) times daily. for 7 days  Dispense: 14 capsule; Refill: 0  -     CULTURE,  URINE    Dysuria  -     POCT Urinalysis(Instrument)    Elevated blood pressure reading            Patient Instructions   About this topic   A urinary tract infection is a UTI. It is caused by germs getting into the urinary tract. The urinary tract is made up of the kidneys, ureters, bladder, and urethra. The urethra is a tube at the bottom of the bladder. Urine flows out of this tube. The germs enter the urethra and then spread in the bladder. The ureters are small tubes that join the bladder and the kidneys. Most UTIs are infections in either your bladder or your kidneys. Bladder infections are more common and may also be called cystitis. Kidney infections are more serious and may also be called pyelonephritis.      Urine culture was ordered to see what bacteria is causing an infection. This will let us know if the antibiotics work best for this infection. You will receive a call in several days with results.     Treatment  Nitrofurantoin (Macrobid) 100 mg twice daily for 7 days. This is an antibiotic to treat infection. Please take to completion.  You can  purchase Azo (phenazopyridine 99 mg) over the counter at drug stores to help with bladder discomfort.  If not allergic, take Tylenol (Acetaminophen) 650 mg to 1 g every 6 hours and/or Motrin (Ibuprofen) 600 to 800 mg every 6 hours as needed for fever or pain.    Care at home  Take your drugs as ordered by your doctor.  Unless your doctor has told you otherwise, drink at least 8 to 10 glasses of water or water-based drinks each day. Do not include drinks with caffeine, like coffee or tea.  Do not hold back your urine. Go to the bathroom every 2 to 3 hours.  Do not drink beer, wine, and mixed drinks (alcohol) or caffeine. These can bother the bladder.  Gently cleanse your genital area each day. Wipe from front to back to keep germs from going in your body.  Empty your bladder before going to sleep.     Return to clinic or go to the emergency room if  Signs of  infection. These include a fever of 100.4°F (38°C) or higher, chills, back pain, nausea, throwing up, or bloody urine.  Signs come back after treatment ends  You notice more blood in your urine.  Your signs get worse or do not improve within 24 hours of starting treatment.  You are not able to urinate for more than 8 hours.  Your signs come back after treatment has stopped.     Should you develop any worsening or new symptoms after leaving urgent care, it is recommended that you go to the ER for further/repeat evaluation.      Follow up with your PCP in 3-5 days after your urgent care visit.     Please remember that you have received care at an urgent care today. Urgent cares are not emergency rooms and are not equipped to handle life threatening emergencies and cannot rule in or out certain medical conditions and you may be released before all of your medical problems are known or treated, please schedule all follow up appointments as discussed and if you have worsening symptoms please go to the ER to rule out potential life threatening problems, as discussed.

## 2024-11-05 NOTE — PATIENT INSTRUCTIONS
About this topic   A urinary tract infection is a UTI. It is caused by germs getting into the urinary tract. The urinary tract is made up of the kidneys, ureters, bladder, and urethra. The urethra is a tube at the bottom of the bladder. Urine flows out of this tube. The germs enter the urethra and then spread in the bladder. The ureters are small tubes that join the bladder and the kidneys. Most UTIs are infections in either your bladder or your kidneys. Bladder infections are more common and may also be called cystitis. Kidney infections are more serious and may also be called pyelonephritis.      Urine culture was ordered to see what bacteria is causing an infection. This will let us know if the antibiotics work best for this infection. You will receive a call in several days with results.     Treatment  Nitrofurantoin (Macrobid) 100 mg twice daily for 7 days. This is an antibiotic to treat infection. Please take to completion.  You can  purchase Azo (phenazopyridine 99 mg) over the counter at drug stores to help with bladder discomfort.  If not allergic, take Tylenol (Acetaminophen) 650 mg to 1 g every 6 hours and/or Motrin (Ibuprofen) 600 to 800 mg every 6 hours as needed for fever or pain.    Care at home  Take your drugs as ordered by your doctor.  Unless your doctor has told you otherwise, drink at least 8 to 10 glasses of water or water-based drinks each day. Do not include drinks with caffeine, like coffee or tea.  Do not hold back your urine. Go to the bathroom every 2 to 3 hours.  Do not drink beer, wine, and mixed drinks (alcohol) or caffeine. These can bother the bladder.  Gently cleanse your genital area each day. Wipe from front to back to keep germs from going in your body.  Empty your bladder before going to sleep.     Return to clinic or go to the emergency room if  Signs of infection. These include a fever of 100.4°F (38°C) or higher, chills, back pain, nausea, throwing up, or bloody urine.  Signs  come back after treatment ends  You notice more blood in your urine.  Your signs get worse or do not improve within 24 hours of starting treatment.  You are not able to urinate for more than 8 hours.  Your signs come back after treatment has stopped.     Should you develop any worsening or new symptoms after leaving urgent care, it is recommended that you go to the ER for further/repeat evaluation.      Follow up with your PCP in 3-5 days after your urgent care visit.     Please remember that you have received care at an urgent care today. Urgent cares are not emergency rooms and are not equipped to handle life threatening emergencies and cannot rule in or out certain medical conditions and you may be released before all of your medical problems are known or treated, please schedule all follow up appointments as discussed and if you have worsening symptoms please go to the ER to rule out potential life threatening problems, as discussed.

## 2024-11-09 LAB — BACTERIA UR CULT: ABNORMAL

## 2024-11-21 ENCOUNTER — OFFICE VISIT (OUTPATIENT)
Dept: URGENT CARE | Facility: CLINIC | Age: 74
End: 2024-11-21
Payer: MEDICARE

## 2024-11-21 VITALS
DIASTOLIC BLOOD PRESSURE: 85 MMHG | BODY MASS INDEX: 39.46 KG/M2 | RESPIRATION RATE: 21 BRPM | WEIGHT: 209 LBS | HEART RATE: 78 BPM | OXYGEN SATURATION: 98 % | TEMPERATURE: 98 F | HEIGHT: 61 IN | SYSTOLIC BLOOD PRESSURE: 193 MMHG

## 2024-11-21 DIAGNOSIS — N30.00 ACUTE CYSTITIS WITHOUT HEMATURIA: Primary | ICD-10-CM

## 2024-11-21 DIAGNOSIS — R03.0 ELEVATED BLOOD PRESSURE READING: ICD-10-CM

## 2024-11-21 DIAGNOSIS — R35.0 FREQUENT URINATION: ICD-10-CM

## 2024-11-21 DIAGNOSIS — N39.0 RECURRENT UTI: ICD-10-CM

## 2024-11-21 LAB
BILIRUBIN, UA POC OHS: NEGATIVE
BLOOD, UA POC OHS: NEGATIVE
CLARITY, UA POC OHS: CLEAR
COLOR, UA POC OHS: YELLOW
GLUCOSE, UA POC OHS: NEGATIVE
KETONES, UA POC OHS: NEGATIVE
LEUKOCYTES, UA POC OHS: ABNORMAL
NITRITE, UA POC OHS: NEGATIVE
PH, UA POC OHS: 7.5
PROTEIN, UA POC OHS: NEGATIVE
SPECIFIC GRAVITY, UA POC OHS: 1.01
UROBILINOGEN, UA POC OHS: 0.2

## 2024-11-21 PROCEDURE — 87088 URINE BACTERIA CULTURE: CPT | Performed by: NURSE PRACTITIONER

## 2024-11-21 PROCEDURE — 87186 SC STD MICRODIL/AGAR DIL: CPT | Performed by: NURSE PRACTITIONER

## 2024-11-21 PROCEDURE — 87086 URINE CULTURE/COLONY COUNT: CPT | Performed by: NURSE PRACTITIONER

## 2024-11-21 RX ORDER — CIPROFLOXACIN 500 MG/1
500 TABLET ORAL 2 TIMES DAILY
Qty: 14 TABLET | Refills: 0 | Status: SHIPPED | OUTPATIENT
Start: 2024-11-21 | End: 2024-11-28

## 2024-11-21 NOTE — PROGRESS NOTES
"Subjective:      Patient ID: Guillermina Martinez is a 74 y.o. female.    Vitals:  height is 5' 1" (1.549 m) and weight is 94.8 kg (209 lb). Her oral temperature is 98.4 °F (36.9 °C). Her blood pressure is 193/85 (abnormal) and her pulse is 78. Her respiration is 21 (abnormal) and oxygen saturation is 98%.     Chief Complaint: Urinary Frequency    Pt is here for urinary frequency. Pt mentioned it came back from last time and mentioned it burns when urinating, and lower back pain. Pt treated with advil    Provider note begins here:  Patient is a 74 year old female with complaints of urinary frequency. Was seen here 8/3/2024 and 11/5/2024 for same symptoms. She was sent a referral to urogynecology but has not scheduled an appt yet. Blood pressure elevated today as well but patient states "it's always high when I go to the doctor". She is not currently taking blood pressure medications and is in between PCPs. She states she is trying to find one she likes.     Urinary Frequency   This is a new problem. The current episode started in the past 7 days. The problem occurs every urination. The problem has been unchanged. The quality of the pain is described as burning. The pain is at a severity of 5/10. The pain is mild. There has been no fever. The fever has been present for Less than 1 day. She is Not sexually active. Associated symptoms include frequency and urgency. She has tried NSAIDs for the symptoms. The treatment provided no relief. Her past medical history is significant for recurrent UTIs.       Genitourinary:  Positive for frequency and urgency.      Objective:     Physical Exam   Constitutional: She is oriented to person, place, and time. She appears well-developed.   HENT:   Head: Normocephalic and atraumatic.   Ears:   Right Ear: External ear normal.   Left Ear: External ear normal.   Nose: Nose normal.   Mouth/Throat: Mucous membranes are normal.   Eyes: Conjunctivae and lids are normal.   Neck: Trachea normal. Neck " supple.   Cardiovascular: Normal rate, regular rhythm and normal heart sounds.   Pulmonary/Chest: Effort normal and breath sounds normal. No stridor. No respiratory distress. She has no wheezes. She has no rhonchi. She has no rales. She exhibits no tenderness.   Abdominal: Normal appearance and bowel sounds are normal. She exhibits no distension and no mass. Soft. There is no abdominal tenderness. There is no left CVA tenderness and no right CVA tenderness. No hernia.   Musculoskeletal: Normal range of motion.         General: Normal range of motion.   Neurological: She is alert and oriented to person, place, and time. She has normal strength.   Skin: Skin is warm, dry, intact, not diaphoretic and not pale.   Psychiatric: Her speech is normal and behavior is normal. Judgment and thought content normal.   Nursing note and vitals reviewed.      Assessment:     1. Acute cystitis without hematuria    2. Frequent urination    3. Recurrent UTI    4. Elevated blood pressure reading      Results for orders placed or performed in visit on 11/21/24   POCT Urinalysis(Instrument)    Collection Time: 11/21/24  8:12 AM   Result Value Ref Range    Color, POC UA Yellow Yellow, Straw, Colorless    Clarity, POC UA Clear Clear    Glucose, POC UA Negative Negative    Bilirubin, POC UA Negative Negative    Ketones, POC UA Negative Negative    Spec Grav POC UA 1.015 1.005 - 1.030    Blood, POC UA Negative Negative    pH, POC UA 7.5 5.0 - 8.0    Protein, POC UA Negative Negative    Urobilinogen, POC UA 0.2 <=1.0    Nitrite, POC UA Negative Negative    WBC, POC UA Small (A) Negative       Plan:       Acute cystitis without hematuria  -     CULTURE, URINE  -     ciprofloxacin HCl (CIPRO) 500 MG tablet; Take 1 tablet (500 mg total) by mouth 2 (two) times daily. for 7 days  Dispense: 14 tablet; Refill: 0  -     Ambulatory referral/consult to Urogynecology    Frequent urination  -     POCT Urinalysis(Instrument)    Recurrent UTI  -      Ambulatory referral/consult to Urogynecology    Elevated blood pressure reading           Patient mentioned low back pain last night but thinks it is related to increased activity (was playing pickleball). There was no CVS tenderness noted on exam. Encouraged patient to follow up with urogynecology to help find cause of these recurrent UTIs.     Urged patient to follow up with PCP regarding elevated blood pressure readings from today's visit along with last few visits. She states she is still trying to find a PCP.     Will treat her with cipro since last urine culture showed resistance to bactrim cefazolin. She was treated with macrobid two weeks ago.    Patient Instructions   Referral ordered for Urogynecology.  Call [phone] to schedule appt       Urine culture was ordered if you have a history of recurrent UTIs, male, pediatrics, and elderly population. You will get a phone call within 3-5 days regarding urine culture results if there any concerns with antibiotic choice. All results will be available on [facility].   If you were prescribed antibiotics, please take them to completion.  If you were prescribed a fluoroquinolone antibiotic such as levofloxacin or ciprofloxacin, avoid heavy lifting for 1-2 weeks after completion of the antibiotic as these antibiotics have a rare complication of tendon rupture. Avoidance of heavy lifting or strenuous exercise reduces this risk.  If you were prescribed a narcotic medication, do not drive or operate heavy equipment or machinery while taking these medications.  Please drink plenty of fluids.  Please get plenty of rest.  If you  smoke, please stop smoking.  If not allergic, take Tylenol (Acetaminophen) 650 mg to  1 g every 6 hours as needed for fever and/or Motrin (Ibuprofen) 600 to 800 mg every 6 hours as needed for fever as directed for control of pain and/or fever      Please remember that you have received care at an urgent care today. Urgent cares are not  emergency rooms and are not equipped to handle life threatening emergencies and cannot rule in or out certain medical conditions and you may be released before all of your medical problems are known or treated. Please arrange follow up with your primary care physician or speciality clinic  within 2-5 days if your signs and symptoms have not resolved or worsen. Patient can call our Referral Hotline at [phone] to make an appointment.    Please return here or go to the Emergency Department for any concerns or worsening of condition.Patient was educated on signs/symptoms that would warrant emergent medical attention.   Signs of infection. These include a fever of 100.4°F (38°C) or higher, chills, back pain, nausea, throwing up, or bloody urine.  Signs come back after treatment ends  You notice more blood in your urine.  Your signs get worse or do not improve within 24 hours of starting treatment.  You are not able to urinate for more than 8 hours.  Your signs come back after treatment has stopped.

## 2024-11-21 NOTE — PATIENT INSTRUCTIONS
Referral ordered for Urogynecology.  Call [phone] to schedule appt       Urine culture was ordered if you have a history of recurrent UTIs, male, pediatrics, and elderly population. You will get a phone call within 3-5 days regarding urine culture results if there any concerns with antibiotic choice. All results will be available on [facility].   If you were prescribed antibiotics, please take them to completion.  If you were prescribed a fluoroquinolone antibiotic such as levofloxacin or ciprofloxacin, avoid heavy lifting for 1-2 weeks after completion of the antibiotic as these antibiotics have a rare complication of tendon rupture. Avoidance of heavy lifting or strenuous exercise reduces this risk.  If you were prescribed a narcotic medication, do not drive or operate heavy equipment or machinery while taking these medications.  Please drink plenty of fluids.  Please get plenty of rest.  If you  smoke, please stop smoking.  If not allergic, take Tylenol (Acetaminophen) 650 mg to  1 g every 6 hours as needed for fever and/or Motrin (Ibuprofen) 600 to 800 mg every 6 hours as needed for fever as directed for control of pain and/or fever      Please remember that you have received care at an urgent care today. Urgent cares are not emergency rooms and are not equipped to handle life threatening emergencies and cannot rule in or out certain medical conditions and you may be released before all of your medical problems are known or treated. Please arrange follow up with your primary care physician or speciality clinic  within 2-5 days if your signs and symptoms have not resolved or worsen. Patient can call our Referral Hotline at [phone] to make an appointment.    Please return here or go to the Emergency Department for any concerns or worsening of condition.Patient was educated on signs/symptoms that would warrant emergent medical attention.   Signs of infection. These include a fever of 100.4°F (38°C)  or higher, chills, back pain, nausea, throwing up, or bloody urine.  Signs come back after treatment ends  You notice more blood in your urine.  Your signs get worse or do not improve within 24 hours of starting treatment.  You are not able to urinate for more than 8 hours.  Your signs come back after treatment has stopped.

## 2024-11-23 LAB — BACTERIA UR CULT: ABNORMAL

## 2024-11-25 ENCOUNTER — TELEPHONE (OUTPATIENT)
Dept: URGENT CARE | Facility: CLINIC | Age: 74
End: 2024-11-25
Payer: MEDICARE

## 2024-11-25 NOTE — TELEPHONE ENCOUNTER
Discussed results with patient. She is feeling much better. Will keep her on current treatment plan.    Results for orders placed or performed in visit on 11/21/24   POCT Urinalysis(Instrument)    Collection Time: 11/21/24  8:12 AM   Result Value Ref Range    Color, POC UA Yellow Yellow, Straw, Colorless    Clarity, POC UA Clear Clear    Glucose, POC UA Negative Negative    Bilirubin, POC UA Negative Negative    Ketones, POC UA Negative Negative    Spec Grav POC UA 1.015 1.005 - 1.030    Blood, POC UA Negative Negative    pH, POC UA 7.5 5.0 - 8.0    Protein, POC UA Negative Negative    Urobilinogen, POC UA 0.2 <=1.0    Nitrite, POC UA Negative Negative    WBC, POC UA Small (A) Negative   CULTURE, URINE    Collection Time: 11/21/24  8:30 AM    Specimen: Urine, Clean Catch   Result Value Ref Range    Urine Culture, Routine ESCHERICHIA COLI  10,000 - 49,999 cfu/ml   (A)        Susceptibility    Escherichia coli - CULTURE, URINE     Amp/Sulbactam >16/8 Resistant mcg/mL     Ampicillin >16 Resistant mcg/mL     Ceftriaxone <=1 Sensitive mcg/mL     Cefazolin >16 Resistant mcg/mL     Ciprofloxacin 0.5 Intermediate mcg/mL     Cefepime <=2 Sensitive mcg/mL     Ertapenem <=0.5 Sensitive mcg/mL     Nitrofurantoin <=32 Sensitive mcg/mL     Gentamicin <=2 Sensitive mcg/mL     Levofloxacin <=0.5 Sensitive mcg/mL     Meropenem <=1 Sensitive mcg/mL     Piperacillin/Tazo <=8 Sensitive mcg/mL     Trimeth/Sulfa >2/38 Resistant mcg/mL     Tobramycin <=2 Sensitive mcg/mL

## 2025-03-17 ENCOUNTER — OFFICE VISIT (OUTPATIENT)
Dept: FAMILY MEDICINE | Facility: CLINIC | Age: 75
End: 2025-03-17
Payer: MEDICARE

## 2025-03-17 DIAGNOSIS — N30.00 ACUTE CYSTITIS WITHOUT HEMATURIA: Primary | ICD-10-CM

## 2025-03-17 DIAGNOSIS — E11.69 TYPE 2 DIABETES MELLITUS WITH OTHER SPECIFIED COMPLICATION, WITHOUT LONG-TERM CURRENT USE OF INSULIN: ICD-10-CM

## 2025-03-17 DIAGNOSIS — I10 HYPERTENSION, ESSENTIAL: ICD-10-CM

## 2025-03-17 LAB
BILIRUB SERPL-MCNC: NEGATIVE MG/DL
BILIRUB UR QL STRIP: NEGATIVE
BLOOD URINE, POC: NEGATIVE
CLARITY UR: CLEAR
CLARITY, POC UA: CLEAR
COLOR UR: COLORLESS
COLOR, POC UA: NORMAL
GLUCOSE UR QL STRIP: NEGATIVE
GLUCOSE UR QL STRIP: NORMAL
HGB UR QL STRIP: NEGATIVE
KETONES UR QL STRIP: NEGATIVE
KETONES UR QL STRIP: NEGATIVE
LEUKOCYTE ESTERASE UR QL STRIP: ABNORMAL
LEUKOCYTE ESTERASE URINE, POC: NORMAL
MICROSCOPIC COMMENT: ABNORMAL
NITRITE UR QL STRIP: NEGATIVE
NITRITE, POC UA: NEGATIVE
PH UR STRIP: 7 [PH] (ref 5–8)
PH, POC UA: 7
PROT UR QL STRIP: NEGATIVE
PROTEIN, POC: NEGATIVE
SP GR UR STRIP: 1.01 (ref 1–1.03)
SPECIFIC GRAVITY, POC UA: 1
URN SPEC COLLECT METH UR: ABNORMAL
UROBILINOGEN UR STRIP-ACNC: NEGATIVE EU/DL
UROBILINOGEN, POC UA: NORMAL
WBC #/AREA URNS HPF: 12 /HPF (ref 0–5)

## 2025-03-17 PROCEDURE — 81000 URINALYSIS NONAUTO W/SCOPE: CPT

## 2025-03-17 PROCEDURE — 87088 URINE BACTERIA CULTURE: CPT

## 2025-03-17 PROCEDURE — 99999 PR PBB SHADOW E&M-EST. PATIENT-LVL IV: CPT | Mod: PBBFAC,,,

## 2025-03-17 PROCEDURE — 99999PBSHW POCT URINE DIPSTICK WITHOUT MICROSCOPE: Mod: PBBFAC,,,

## 2025-03-17 PROCEDURE — 87086 URINE CULTURE/COLONY COUNT: CPT

## 2025-03-17 PROCEDURE — G0447 BEHAVIOR COUNSEL OBESITY 15M: HCPCS | Mod: PBBFAC,PN

## 2025-03-17 PROCEDURE — 87186 SC STD MICRODIL/AGAR DIL: CPT

## 2025-03-17 PROCEDURE — 99214 OFFICE O/P EST MOD 30 MIN: CPT | Mod: PBBFAC,PN

## 2025-03-17 PROCEDURE — 81002 URINALYSIS NONAUTO W/O SCOPE: CPT | Mod: PBBFAC,PN

## 2025-03-17 NOTE — PROGRESS NOTES
DAPHNE Martinez is a 74 y.o. female with multiple medical diagnoses as listed in the medical history and problem list that presents for   Chief Complaint   Patient presents with    Urinary Tract Infection     Pt c/o burning when urinating started last night but does not have burning today.     Pt presents today for acute cystitis for 2-3 days with sxs of urinary frequency, urgency that all resolved this AM. She states that her dysuria was at its worst last night, however, is not having any sxs at all this AM. She notes that certain foods trigger her UTI, such as chinese food, and tries to avoid them. She has not taken any medications recently for pain.  She's been trying to change up her diet recently - gave up sweets and junk food for lent.  She states that she hasn't been following up or establish care with PCP recently since every time she sees one that she likes to establish with, they leave. She expresses that it's already difficult for her to attend doctor's appointments, as she feels very nervous about going to them.  Denies hematuria, flank pain, fevers, chills      Assessment & Plan     1. Acute cystitis without hematuria  - Given just trace leukocytes present w/no nitrites and blood on POCT urine, will send out urine and urine culture given hx of recurrent UTI's.  - Pt agreed to wait before urine culture returns, and if positive, will treat with abx.    - Urinalysis  - Urine Culture High Risk  - POCT URINE DIPSTICK WITHOUT MICROSCOPE    2. Hypertension, essential  - On chart review, patient's bp has been elevated at every visit. Pt states that she used to be on Chlorthalidone years ago, however, self-discontinued because she did not like how it made her feel and she prefers not to be on a diuretic.  - Offered to start on a new medication today and offered to f/u with me or to establish care with another PCP, patient declined and stated that she prefers to self-schedule and will call back when ready  -  "Discussed w/patient on increased cardiovascular risks given her persistent previous elevated readings.  - Pt to continue w/regular aerobic exercises and engage in low sodium diet    3. Body mass index (BMI) 39.0-39.9, adult  Behavioral counseling for obesity.  History:  - Patient's BMI: Estimated body mass index is 39.86 kg/m² as calculated from the following:    Height as of this encounter: 5' 1" (1.549 m).    Weight as of this encounter: 95.7 kg (210 lb 15.7 oz).  - Patient reports challenges related to weight management.  - Patient meets criteria for obesity counseling: BMI >= 30 kg/m² or >= 25 kg/m² with associated comorbidities.  - Discussion focused on the benefits of sustained weight loss and its impact on health outcomes.  Plan:  Dietary Guidance: Recommended caloric intake tailored to patient needs, emphasizing nutrient-dense, low-calorie foods. Discussed portion control and meal planning strategies.  Physical Activity: Encouraged moderate physical activity for at least 150 minutes per week, tailored to patients current physical abilities and goals.  Behavioral Strategies: Addressed behaviors contributing to weight gain and set actionable goals, including [food diary, mindful eating, avoiding late-night snacks, etc.]  Motivational Counseling: Discussed the importance of intrinsic and extrinsic motivators in achieving sustained weight management. Reinforced positive behavior changes.  Follow-Up Plan: Patient instructed to return for follow-up for progress evaluation and continued counseling.  Time Spent: A total of 16 minutes was spent in face-to-face behavioral counseling focused on weight loss and obesity management.  Patient Understanding: Patient actively participated in the session and verbalized understanding of the discussed plan.      4. Type 2 diabetes mellitus with other specified complication, without long-term current use of insulin   - Pt currently not on any diabetic medication. She had " expressed that she is very sensitive to medications. We discussed about her outstanding care gaps, requiring updated labs, and adding on medications, offered assistance to follow up with me again or to establish care with MD, patient declined and preferred to self-schedule.    --------------------------------------------    Health Maintenance         Date Due Completion Date    TETANUS VACCINE Never done ---    Mammogram Never done ---    DEXA Scan Never done ---    Colorectal Cancer Screening Never done ---    Shingles Vaccine (1 of 2) Never done ---    Pneumococcal Vaccines (Age 50+) (1 of 1 - PCV) Never done ---    RSV Vaccine (Age 60+ and Pregnant patients) (1 - Risk 60-74 years 1-dose series) Never done ---    Influenza Vaccine (1) Never done ---    COVID-19 Vaccine (1 - 2024-25 season) Never done ---    Lipid Panel 07/05/2029 7/5/2024            Health maintenance reviewed    Follow Up:  Follow up if symptoms worsen or fail to improve.    Exam     Review of Systems:  (as noted above)  Review of Systems   Constitutional:  Negative for chills, fatigue and fever.   HENT:  Negative for congestion.    Eyes:  Negative for visual disturbance.   Respiratory:  Negative for cough, chest tightness, shortness of breath and wheezing.    Cardiovascular:  Negative for chest pain and palpitations.   Gastrointestinal:  Negative for abdominal pain, diarrhea, nausea and vomiting.   Genitourinary:  Positive for dysuria (resolved), frequency (resolved) and urgency (resolved). Negative for flank pain.   Musculoskeletal:  Negative for gait problem and myalgias.   Skin:  Negative for rash.   Neurological:  Negative for dizziness, weakness, light-headedness and headaches.       Physical Exam  Constitutional:       General: She is not in acute distress.     Appearance: Normal appearance. She is not ill-appearing.   Cardiovascular:      Rate and Rhythm: Normal rate and regular rhythm.      Pulses: Normal pulses.      Heart sounds:  "Normal heart sounds. No murmur heard.     No friction rub. No gallop.   Pulmonary:      Effort: Pulmonary effort is normal. No respiratory distress.      Breath sounds: Normal breath sounds. No wheezing, rhonchi or rales.   Abdominal:      Tenderness: There is no abdominal tenderness.   Skin:     General: Skin is warm and dry.      Capillary Refill: Capillary refill takes less than 2 seconds.   Neurological:      General: No focal deficit present.      Mental Status: She is alert and oriented to person, place, and time.   Psychiatric:         Mood and Affect: Mood normal.         Behavior: Behavior normal.       Vitals:    03/17/25 0858 03/17/25 0923   BP: (!) 176/90 (!) 174/80   BP Location: Right arm    Patient Position: Sitting    Pulse: 73    Temp: 97.8 °F (36.6 °C)    TempSrc: Oral    SpO2: 98%    Weight: 95.7 kg (210 lb 15.7 oz)    Height: 5' 1" (1.549 m)       Body mass index is 39.86 kg/m².        History     Past Medical History:   Diagnosis Date    Allergy        No family history on file.    Allergies and Medications: (updated and reviewed)  Review of patient's allergies indicates:  No Known Allergies  Current Medications[1]    Patient Care Team:  No, Primary Doctor as PCP - General         - The patient is given an After Visit Summary that lists all medications with directions, allergies, education, orders placed during this encounter and follow-up instructions.      - I have reviewed the patient's medical information including past medical and family history sections including the medications and allergies.      - We discussed the patient's current medications.   This note was generated with the assistance of ambient listening technology. Verbal consent was obtained by the patient and accompanying visitor(s) for the recording of patient appointment to facilitate this note. I attest to having reviewed and edited the generated note for accuracy, though some syntax or spelling errors may persist. Please " contact the author of this note for any clarification.          Ciro Moran NP                      [1]   Current Outpatient Medications   Medication Sig Dispense Refill    ascorbic acid, vitamin C, (VITAMIN C) 1000 MG tablet Take 1,500 mg by mouth once daily.      b complex vitamins tablet Take 1 tablet by mouth once daily.      ergocalciferol, vitamin D2, (VITAMIN D ORAL) Take 2,000 Int'l Units by mouth once daily.      loratadine (CLARITIN) 5 mg chewable tablet Take 5 mg by mouth once daily.      multivitamin capsule Take 1 capsule by mouth once daily.      omega-3 fatty acids/fish oil (FISH OIL-OMEGA-3 FATTY ACIDS) 300-1,000 mg capsule Take by mouth once daily.      diclofenac sodium (VOLTAREN) 1 % Gel Apply 2 g topically daily as needed (pain). (Patient not taking: Reported on 11/20/2023) 100 g 1     No current facility-administered medications for this visit.

## 2025-03-19 ENCOUNTER — RESULTS FOLLOW-UP (OUTPATIENT)
Dept: FAMILY MEDICINE | Facility: CLINIC | Age: 75
End: 2025-03-19

## 2025-03-19 VITALS
HEIGHT: 67 IN | DIASTOLIC BLOOD PRESSURE: 80 MMHG | WEIGHT: 211 LBS | SYSTOLIC BLOOD PRESSURE: 174 MMHG | BODY MASS INDEX: 33.12 KG/M2 | TEMPERATURE: 98 F | HEART RATE: 73 BPM | OXYGEN SATURATION: 98 %

## 2025-03-19 DIAGNOSIS — B96.20 E. COLI BACTEREMIA: Primary | ICD-10-CM

## 2025-03-19 DIAGNOSIS — R78.81 E. COLI BACTEREMIA: Primary | ICD-10-CM

## 2025-03-19 LAB — BACTERIA UR CULT: ABNORMAL

## 2025-03-19 RX ORDER — NITROFURANTOIN 25; 75 MG/1; MG/1
100 CAPSULE ORAL 2 TIMES DAILY
Qty: 10 CAPSULE | Refills: 0 | Status: SHIPPED | OUTPATIENT
Start: 2025-03-19 | End: 2025-03-24

## 2025-03-19 NOTE — PROGRESS NOTES
Called pt to inform small amt of e coli growth in urine - pt stated that she's still experiencing urinary frequency, though also is increasing her water intake. She accepted abx to be sent in.   Answered all questions.

## 2025-03-19 NOTE — PROGRESS NOTES
Called patient to inform small amt of E coli growing in urine. Offered abx to be sent in and she accepted. She notes that she's still experiencing urinary frequency, though is also increasing her water intake.  Answered all questions.

## 2025-06-12 ENCOUNTER — OFFICE VISIT (OUTPATIENT)
Dept: FAMILY MEDICINE | Facility: CLINIC | Age: 75
End: 2025-06-12
Payer: MEDICARE

## 2025-06-12 VITALS
SYSTOLIC BLOOD PRESSURE: 160 MMHG | WEIGHT: 206.38 LBS | TEMPERATURE: 98 F | HEIGHT: 67 IN | HEART RATE: 73 BPM | DIASTOLIC BLOOD PRESSURE: 72 MMHG | BODY MASS INDEX: 32.39 KG/M2 | OXYGEN SATURATION: 97 %

## 2025-06-12 DIAGNOSIS — N30.00 ACUTE CYSTITIS WITHOUT HEMATURIA: Primary | ICD-10-CM

## 2025-06-12 DIAGNOSIS — I10 HYPERTENSION, ESSENTIAL: ICD-10-CM

## 2025-06-12 DIAGNOSIS — E11.69 TYPE 2 DIABETES MELLITUS WITH OTHER SPECIFIED COMPLICATION, WITHOUT LONG-TERM CURRENT USE OF INSULIN: ICD-10-CM

## 2025-06-12 LAB
BACTERIA #/AREA URNS AUTO: ABNORMAL /HPF
BILIRUB SERPL-MCNC: NEGATIVE MG/DL
BILIRUB UR QL STRIP.AUTO: NEGATIVE
BLOOD URINE, POC: 50
CLARITY UR: ABNORMAL
CLARITY, POC UA: NORMAL
COLOR UR AUTO: YELLOW
COLOR, POC UA: YELLOW
GLUCOSE UR QL STRIP: NEGATIVE
GLUCOSE UR QL STRIP: NORMAL
HGB UR QL STRIP: NEGATIVE
KETONES UR QL STRIP: NEGATIVE
KETONES UR QL STRIP: NEGATIVE
LEUKOCYTE ESTERASE UR QL STRIP: ABNORMAL
LEUKOCYTE ESTERASE URINE, POC: NORMAL
MICROSCOPIC COMMENT: ABNORMAL
NITRITE UR QL STRIP: POSITIVE
NITRITE, POC UA: NEGATIVE
PH UR STRIP: 7 [PH]
PH, POC UA: 7
PROT UR QL STRIP: NEGATIVE
PROTEIN, POC: NORMAL
RBC #/AREA URNS AUTO: 1 /HPF (ref 0–4)
SP GR UR STRIP: 1.01
SPECIFIC GRAVITY, POC UA: 1.01
UROBILINOGEN UR STRIP-ACNC: NEGATIVE EU/DL
UROBILINOGEN, POC UA: NORMAL
WBC #/AREA URNS AUTO: 60 /HPF (ref 0–5)
WBC CLUMPS UR QL AUTO: ABNORMAL

## 2025-06-12 PROCEDURE — 99214 OFFICE O/P EST MOD 30 MIN: CPT | Mod: S$PBB,,,

## 2025-06-12 PROCEDURE — 99999 PR PBB SHADOW E&M-EST. PATIENT-LVL III: CPT | Mod: PBBFAC,,,

## 2025-06-12 PROCEDURE — 87086 URINE CULTURE/COLONY COUNT: CPT

## 2025-06-12 PROCEDURE — 99213 OFFICE O/P EST LOW 20 MIN: CPT | Mod: PBBFAC,PN

## 2025-06-12 PROCEDURE — 81003 URINALYSIS AUTO W/O SCOPE: CPT

## 2025-06-12 PROCEDURE — 99999PBSHW POCT URINE DIPSTICK WITHOUT MICROSCOPE: Mod: PBBFAC,,,

## 2025-06-12 PROCEDURE — 81002 URINALYSIS NONAUTO W/O SCOPE: CPT | Mod: PBBFAC,PN

## 2025-06-12 RX ORDER — NITROFURANTOIN 25; 75 MG/1; MG/1
100 CAPSULE ORAL 2 TIMES DAILY
Qty: 10 CAPSULE | Refills: 0 | Status: SHIPPED | OUTPATIENT
Start: 2025-06-12 | End: 2025-06-17

## 2025-06-12 NOTE — PROGRESS NOTES
DAPHNE Martinez is a 75 y.o. female with multiple medical diagnoses as listed in the medical history and problem list that presents for   Chief Complaint   Patient presents with    Urinary Tract Infection       Pt known to em presents today for UTI sxs of urgency, dysuria, frequency that have resolved now. She noticed that this is triggered by certain foods that she eat. She's noticing hot/spicy foods trigger her symptoms, especially boiled seafood. However, after she has her bowel movements, her symptoms are relieved.   She also would like to start something for her hypertension, however,would like to avoid any diuretics as she did not like the way they made her feel in the past. She states she is not wanting to start it today though, she would like to consider it after her vacation coming up.  She states she also feels like she's taking too many vitamins and regularly takes - Vitamin D3, B12, zinc, vitamin C, fish oil, multivitamin w/omega 3, 2 different probiotic, turmeric.       Assessment & Plan     1. Acute cystitis without hematuria  - POCT urine - + blood and leukocytes, will empirically treat. As it has been around >3 months from taking Macrobid, will empirically treat again and send out urine. Pt notes that she is very sensitive to medications, prefers not to switch antibiotic  - I informed her given her recent frequent UTI's, it would be beneficial for her to get established w/a urogynecologist to determine causes of her frequent infections. Patient declined, she does not want to undergo any invasive procedures/process. I let her know she can call back if interested at any time.      - POCT URINE DIPSTICK WITHOUT MICROSCOPE  - Urinalysis  - Urine Culture High Risk  - nitrofurantoin, macrocrystal-monohydrate, (MACROBID) 100 MG capsule; Take 1 capsule (100 mg total) by mouth 2 (two) times daily. for 5 days  Dispense: 10 capsule; Refill: 0    2. Hypertension, essential  - Blood pressure today elevated in  clinic. On chart review, patient was on Chlorthalidone, however, self-discontinued because she did not like the side effects. I informed her benefits of starting antihypertensive today given her persistent elevated readings - patient stated she will go to vacation soon and would not like to start on it now. She would like to change her lifestyle habits first as well.  - Patient refused 2 week nurse visit bp repeat to be scheduled  - Recommend low-sodium diet and compliance with blood pressure medication.  - Keep blood pressure goal <140/90 and f/u with clinic if persistently elevated      3. Type 2 diabetes mellitus with other specified complication, without long-term current use of insulin  - A1C 6.6 on 7/24 labs. Pt not on any diabetic medication for treatment, I informed her benefits of her starting on medication, patient states she would like to work on lifestyle changes first  - Lose weight and aim for a healthy weight.  Even losing 10% of your current weight can help.  - Focus on eliminating all added sugar in your diet and avoiding foods like rice and pasta.  - Focus on lean proteins and plenty of non-starchy vegetable at your meals.  - Exercise at least 30 minutes, at least 5 times per week.    **Informed patient of multiple care gaps, due for annual, and comprehensive blood work. She would like to establish care with a MD's of her interest, however, states that she will reach back out to get scheduled if interested. Offered to get scheduled for mammogram, DEXA scan, and colorectal cancer screening, patient declined all today**    --------------------------------------------    Health Maintenance         Date Due Completion Date    TETANUS VACCINE Never done ---    Mammogram Never done ---    DEXA Scan Never done ---    Colorectal Cancer Screening Never done ---    Shingles Vaccine (1 of 2) Never done ---    Pneumococcal Vaccines (Age 50+) (1 of 1 - PCV) Never done ---    RSV Vaccine (Age 60+ and Pregnant  patients) (1 - Risk 60-74 years 1-dose series) Never done ---    COVID-19 Vaccine (1 - 2024-25 season) Never done ---    Influenza Vaccine (Season Ended) 09/01/2025 ---    Lipid Panel 07/05/2029 7/5/2024            Discussed the importance of overdue vaccines which were offered during this encounter. Patient declined overdue vaccines at this time    Follow Up:  Follow up if symptoms worsen or fail to improve.    Exam     Review of Systems:  (as noted above)  Review of Systems   Constitutional:  Negative for chills, fatigue and fever.   HENT:  Negative for trouble swallowing.    Eyes:  Negative for visual disturbance.   Respiratory:  Negative for cough, chest tightness and shortness of breath.    Cardiovascular:  Negative for chest pain, palpitations and leg swelling.   Gastrointestinal:  Negative for abdominal pain, diarrhea, nausea and vomiting.   Genitourinary:  Positive for dysuria, frequency and urgency. Negative for flank pain and hematuria.   Musculoskeletal:  Negative for arthralgias, gait problem and myalgias.   Skin:  Negative for rash.   Neurological:  Negative for dizziness, weakness, light-headedness and headaches.   Psychiatric/Behavioral:  The patient is not nervous/anxious.        Physical Exam  Constitutional:       General: She is not in acute distress.     Appearance: Normal appearance. She is not ill-appearing.   Cardiovascular:      Rate and Rhythm: Normal rate and regular rhythm.      Pulses: Normal pulses.      Heart sounds: Normal heart sounds. No murmur heard.     No friction rub. No gallop.   Pulmonary:      Effort: Pulmonary effort is normal. No respiratory distress.      Breath sounds: Normal breath sounds. No wheezing, rhonchi or rales.   Skin:     General: Skin is warm and dry.      Capillary Refill: Capillary refill takes less than 2 seconds.   Neurological:      General: No focal deficit present.      Mental Status: She is alert and oriented to person, place, and time.   Psychiatric:    "      Mood and Affect: Mood normal.         Behavior: Behavior normal.       Vitals:    06/12/25 0815   BP: (!) 176/80   Pulse: 73   Temp: 97.7 °F (36.5 °C)   TempSrc: Oral   SpO2: 97%   Weight: 93.6 kg (206 lb 5.6 oz)   Height: 5' 7" (1.702 m)      Body mass index is 32.32 kg/m².        History     Past Medical History:   Diagnosis Date    Allergy        No family history on file.    Allergies and Medications: (updated and reviewed)  Review of patient's allergies indicates:  No Known Allergies  Current Medications[1]    Patient Care Team:  No, Primary Doctor as PCP - General         - The patient is given an After Visit Summary that lists all medications with directions, allergies, education, orders placed during this encounter and follow-up instructions.      - I have reviewed the patient's medical information including past medical and family history sections including the medications and allergies.      - We discussed the patient's current medications.   This note was generated with the assistance of ambient listening technology. Verbal consent was obtained by the patient and accompanying visitor(s) for the recording of patient appointment to facilitate this note. I attest to having reviewed and edited the generated note for accuracy, though some syntax or spelling errors may persist. Please contact the author of this note for any clarification.          Ciro Moran NP                      [1]   Current Outpatient Medications   Medication Sig Dispense Refill    ascorbic acid, vitamin C, (VITAMIN C) 1000 MG tablet Take 1,500 mg by mouth once daily.      b complex vitamins tablet Take 1 tablet by mouth once daily.      ergocalciferol, vitamin D2, (VITAMIN D ORAL) Take 2,000 Int'l Units by mouth once daily.      loratadine (CLARITIN) 5 mg chewable tablet Take 5 mg by mouth once daily.      multivitamin capsule Take 1 capsule by mouth once daily.      omega-3 fatty acids/fish oil (FISH OIL-OMEGA-3 FATTY ACIDS) " 300-1,000 mg capsule Take by mouth once daily.      diclofenac sodium (VOLTAREN) 1 % Gel Apply 2 g topically daily as needed (pain). (Patient not taking: Reported on 6/12/2025) 100 g 1    nitrofurantoin, macrocrystal-monohydrate, (MACROBID) 100 MG capsule Take 1 capsule (100 mg total) by mouth 2 (two) times daily. for 5 days 10 capsule 0     No current facility-administered medications for this visit.

## 2025-06-13 ENCOUNTER — RESULTS FOLLOW-UP (OUTPATIENT)
Dept: FAMILY MEDICINE | Facility: CLINIC | Age: 75
End: 2025-06-13

## 2025-06-14 LAB — BACTERIA UR CULT: ABNORMAL
